# Patient Record
Sex: MALE | Race: WHITE | NOT HISPANIC OR LATINO | URBAN - METROPOLITAN AREA
[De-identification: names, ages, dates, MRNs, and addresses within clinical notes are randomized per-mention and may not be internally consistent; named-entity substitution may affect disease eponyms.]

---

## 2022-08-28 ENCOUNTER — INPATIENT (INPATIENT)
Facility: HOSPITAL | Age: 77
LOS: 6 days | Discharge: ANOTHER HOSPITAL | End: 2022-09-04
Attending: HOSPITALIST | Admitting: HOSPITALIST

## 2022-08-28 VITALS
RESPIRATION RATE: 17 BRPM | WEIGHT: 119.93 LBS | HEIGHT: 68 IN | TEMPERATURE: 98 F | DIASTOLIC BLOOD PRESSURE: 66 MMHG | OXYGEN SATURATION: 95 % | SYSTOLIC BLOOD PRESSURE: 109 MMHG | HEART RATE: 79 BPM

## 2022-08-28 DIAGNOSIS — E87.1 HYPO-OSMOLALITY AND HYPONATREMIA: ICD-10-CM
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
## 2022-08-28 DIAGNOSIS — D50.9 IRON DEFICIENCY ANEMIA, UNSPECIFIED: ICD-10-CM

## 2022-08-28 DIAGNOSIS — R32 UNSPECIFIED URINARY INCONTINENCE: ICD-10-CM

## 2022-08-28 DIAGNOSIS — M48.56XA COLLAPSED VERTEBRA, NOT ELSEWHERE CLASSIFIED, LUMBAR REGION, INITIAL ENCOUNTER FOR FRACTURE: ICD-10-CM
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
## 2022-08-28 DIAGNOSIS — Z95.5 PRESENCE OF CORONARY ANGIOPLASTY IMPLANT AND GRAFT: ICD-10-CM

## 2022-08-28 DIAGNOSIS — M84.48XA PATHOLOGICAL FRACTURE, OTHER SITE, INITIAL ENCOUNTER FOR FRACTURE: ICD-10-CM

## 2022-08-28 DIAGNOSIS — R63.6 UNDERWEIGHT: ICD-10-CM
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
## 2022-08-28 DIAGNOSIS — M48.54XA COLLAPSED VERTEBRA, NOT ELSEWHERE CLASSIFIED, THORACIC REGION, INITIAL ENCOUNTER FOR FRACTURE: ICD-10-CM

## 2022-08-28 DIAGNOSIS — M40.209 UNSPECIFIED KYPHOSIS, SITE UNSPECIFIED: ICD-10-CM

## 2022-08-28 DIAGNOSIS — S22.43XA MULTIPLE FRACTURES OF RIBS, BILATERAL, INITIAL ENCOUNTER FOR CLOSED FRACTURE: ICD-10-CM

## 2022-08-28 DIAGNOSIS — S72.142A DISPLACED INTERTROCHANTERIC FRACTURE OF LEFT FEMUR, INITIAL ENCOUNTER FOR CLOSED FRACTURE: ICD-10-CM

## 2022-08-28 DIAGNOSIS — R29.6 REPEATED FALLS: ICD-10-CM

## 2022-08-28 DIAGNOSIS — M81.0 AGE-RELATED OSTEOPOROSIS WITHOUT CURRENT PATHOLOGICAL FRACTURE: ICD-10-CM
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
## 2022-08-28 DIAGNOSIS — K59.00 CONSTIPATION, UNSPECIFIED: ICD-10-CM

## 2022-08-28 DIAGNOSIS — Z20.822 CONTACT WITH AND (SUSPECTED) EXPOSURE TO COVID-19: ICD-10-CM

## 2022-08-28 DIAGNOSIS — I10 ESSENTIAL (PRIMARY) HYPERTENSION: ICD-10-CM

## 2022-08-28 DIAGNOSIS — G20 PARKINSON'S DISEASE: ICD-10-CM

## 2022-08-28 DIAGNOSIS — I25.10 ATHEROSCLEROTIC HEART DISEASE OF NATIVE CORONARY ARTERY WITHOUT ANGINA PECTORIS: ICD-10-CM

## 2022-08-28 LAB
ALBUMIN SERPL ELPH-MCNC: 3.5 G/DL — SIGNIFICANT CHANGE UP (ref 3.5–5.2)
ALP SERPL-CCNC: 128 U/L — HIGH (ref 30–115)
ALT FLD-CCNC: <5 U/L — SIGNIFICANT CHANGE UP (ref 0–41)
ANION GAP SERPL CALC-SCNC: 9 MMOL/L — SIGNIFICANT CHANGE UP (ref 7–14)
APPEARANCE UR: CLEAR — SIGNIFICANT CHANGE UP
APTT BLD: 39.7 SEC — HIGH (ref 27–39.2)
AST SERPL-CCNC: 25 U/L — SIGNIFICANT CHANGE UP (ref 0–41)
BACTERIA # UR AUTO: NEGATIVE — SIGNIFICANT CHANGE UP
BASOPHILS # BLD AUTO: 0.03 K/UL — SIGNIFICANT CHANGE UP (ref 0–0.2)
BASOPHILS NFR BLD AUTO: 0.3 % — SIGNIFICANT CHANGE UP (ref 0–1)
BILIRUB SERPL-MCNC: 0.3 MG/DL — SIGNIFICANT CHANGE UP (ref 0.2–1.2)
BILIRUB UR-MCNC: NEGATIVE — SIGNIFICANT CHANGE UP
BLD GP AB SCN SERPL QL: SIGNIFICANT CHANGE UP
BUN SERPL-MCNC: 28 MG/DL — HIGH (ref 10–20)
CALCIUM SERPL-MCNC: 9.3 MG/DL — SIGNIFICANT CHANGE UP (ref 8.5–10.1)
CHLORIDE SERPL-SCNC: 102 MMOL/L — SIGNIFICANT CHANGE UP (ref 98–110)
CO2 SERPL-SCNC: 21 MMOL/L — SIGNIFICANT CHANGE UP (ref 17–32)
COD CRY URNS QL: NEGATIVE — SIGNIFICANT CHANGE UP
COLOR SPEC: YELLOW — SIGNIFICANT CHANGE UP
CREAT SERPL-MCNC: 0.7 MG/DL — SIGNIFICANT CHANGE UP (ref 0.7–1.5)
DIFF PNL FLD: NEGATIVE — SIGNIFICANT CHANGE UP
EGFR: 94 ML/MIN/1.73M2 — SIGNIFICANT CHANGE UP
EOSINOPHIL # BLD AUTO: 0 K/UL — SIGNIFICANT CHANGE UP (ref 0–0.7)
EOSINOPHIL NFR BLD AUTO: 0 % — SIGNIFICANT CHANGE UP (ref 0–8)
EPI CELLS # UR: NEGATIVE — SIGNIFICANT CHANGE UP
GLUCOSE SERPL-MCNC: 115 MG/DL — HIGH (ref 70–99)
GLUCOSE UR QL: NEGATIVE MG/DL — SIGNIFICANT CHANGE UP
GRAN CASTS # UR COMP ASSIST: NEGATIVE — SIGNIFICANT CHANGE UP
HCT VFR BLD CALC: 30.8 % — LOW (ref 42–52)
HGB BLD-MCNC: 9.9 G/DL — LOW (ref 14–18)
HYALINE CASTS # UR AUTO: NEGATIVE — SIGNIFICANT CHANGE UP
IMM GRANULOCYTES NFR BLD AUTO: 0.6 % — HIGH (ref 0.1–0.3)
INR BLD: 1.11 RATIO — SIGNIFICANT CHANGE UP (ref 0.65–1.3)
KETONES UR-MCNC: NEGATIVE — SIGNIFICANT CHANGE UP
LEUKOCYTE ESTERASE UR-ACNC: NEGATIVE — SIGNIFICANT CHANGE UP
LYMPHOCYTES # BLD AUTO: 0.96 K/UL — LOW (ref 1.2–3.4)
LYMPHOCYTES # BLD AUTO: 11.1 % — LOW (ref 20.5–51.1)
MCHC RBC-ENTMCNC: 26.9 PG — LOW (ref 27–31)
MCHC RBC-ENTMCNC: 32.1 G/DL — SIGNIFICANT CHANGE UP (ref 32–37)
MCV RBC AUTO: 83.7 FL — SIGNIFICANT CHANGE UP (ref 80–94)
MONOCYTES # BLD AUTO: 0.9 K/UL — HIGH (ref 0.1–0.6)
MONOCYTES NFR BLD AUTO: 10.4 % — HIGH (ref 1.7–9.3)
NEUTROPHILS # BLD AUTO: 6.69 K/UL — HIGH (ref 1.4–6.5)
NEUTROPHILS NFR BLD AUTO: 77.6 % — HIGH (ref 42.2–75.2)
NITRITE UR-MCNC: NEGATIVE — SIGNIFICANT CHANGE UP
NRBC # BLD: 0 /100 WBCS — SIGNIFICANT CHANGE UP (ref 0–0)
PH UR: 6.5 — SIGNIFICANT CHANGE UP (ref 5–8)
PLATELET # BLD AUTO: 321 K/UL — SIGNIFICANT CHANGE UP (ref 130–400)
POTASSIUM SERPL-MCNC: 4.7 MMOL/L — SIGNIFICANT CHANGE UP (ref 3.5–5)
POTASSIUM SERPL-SCNC: 4.7 MMOL/L — SIGNIFICANT CHANGE UP (ref 3.5–5)
PROT SERPL-MCNC: 6.4 G/DL — SIGNIFICANT CHANGE UP (ref 6–8)
PROT UR-MCNC: ABNORMAL MG/DL
PROTHROM AB SERPL-ACNC: 12.8 SEC — SIGNIFICANT CHANGE UP (ref 9.95–12.87)
RBC # BLD: 3.68 M/UL — LOW (ref 4.7–6.1)
RBC # FLD: 13.2 % — SIGNIFICANT CHANGE UP (ref 11.5–14.5)
RBC CASTS # UR COMP ASSIST: NEGATIVE — SIGNIFICANT CHANGE UP
SARS-COV-2 RNA SPEC QL NAA+PROBE: SIGNIFICANT CHANGE UP
SODIUM SERPL-SCNC: 132 MMOL/L — LOW (ref 135–146)
SP GR SPEC: 1.02 — SIGNIFICANT CHANGE UP (ref 1.01–1.03)
TRI-PHOS CRY UR QL COMP ASSIST: NEGATIVE — SIGNIFICANT CHANGE UP
URATE CRY FLD QL MICRO: NEGATIVE — SIGNIFICANT CHANGE UP
UROBILINOGEN FLD QL: 0.2 MG/DL — SIGNIFICANT CHANGE UP
WBC # BLD: 8.63 K/UL — SIGNIFICANT CHANGE UP (ref 4.8–10.8)
WBC # FLD AUTO: 8.63 K/UL — SIGNIFICANT CHANGE UP (ref 4.8–10.8)
WBC UR QL: SIGNIFICANT CHANGE UP /HPF

## 2022-08-28 PROCEDURE — 99285 EMERGENCY DEPT VISIT HI MDM: CPT | Mod: GC

## 2022-08-28 PROCEDURE — 71250 CT THORAX DX C-: CPT | Mod: 26,MA

## 2022-08-28 PROCEDURE — 74176 CT ABD & PELVIS W/O CONTRAST: CPT | Mod: 26,MA

## 2022-08-28 PROCEDURE — 72125 CT NECK SPINE W/O DYE: CPT | Mod: 26,MA

## 2022-08-28 PROCEDURE — 70450 CT HEAD/BRAIN W/O DYE: CPT | Mod: 26,MA

## 2022-08-28 RX ORDER — CARBIDOPA AND LEVODOPA 25; 100 MG/1; MG/1
1.5 TABLET ORAL
Refills: 0 | Status: DISCONTINUED | OUTPATIENT
Start: 2022-08-28 | End: 2022-09-04

## 2022-08-28 RX ORDER — SELEGILINE HYDROCHLORIDE 1.25 MG/1
5 TABLET, ORALLY DISINTEGRATING ORAL
Refills: 0 | Status: DISCONTINUED | OUTPATIENT
Start: 2022-08-28 | End: 2022-09-04

## 2022-08-28 RX ORDER — ASPIRIN/CALCIUM CARB/MAGNESIUM 324 MG
1 TABLET ORAL
Qty: 0 | Refills: 0 | DISCHARGE

## 2022-08-28 RX ORDER — ENOXAPARIN SODIUM 100 MG/ML
30 INJECTION SUBCUTANEOUS EVERY 24 HOURS
Refills: 0 | Status: DISCONTINUED | OUTPATIENT
Start: 2022-08-28 | End: 2022-08-28

## 2022-08-28 RX ORDER — ASCORBIC ACID 60 MG
1 TABLET,CHEWABLE ORAL
Qty: 0 | Refills: 0 | DISCHARGE

## 2022-08-28 RX ORDER — ASPIRIN/CALCIUM CARB/MAGNESIUM 324 MG
81 TABLET ORAL DAILY
Refills: 0 | Status: DISCONTINUED | OUTPATIENT
Start: 2022-08-28 | End: 2022-08-30

## 2022-08-28 RX ORDER — PANTOPRAZOLE SODIUM 20 MG/1
40 TABLET, DELAYED RELEASE ORAL
Refills: 0 | Status: DISCONTINUED | OUTPATIENT
Start: 2022-08-28 | End: 2022-09-04

## 2022-08-28 RX ORDER — METOPROLOL TARTRATE 50 MG
50 TABLET ORAL DAILY
Refills: 0 | Status: DISCONTINUED | OUTPATIENT
Start: 2022-08-28 | End: 2022-09-04

## 2022-08-28 RX ORDER — CARBIDOPA AND LEVODOPA 25; 100 MG/1; MG/1
1 TABLET ORAL
Qty: 0 | Refills: 0 | DISCHARGE

## 2022-08-28 RX ORDER — ATORVASTATIN CALCIUM 80 MG/1
20 TABLET, FILM COATED ORAL AT BEDTIME
Refills: 0 | Status: DISCONTINUED | OUTPATIENT
Start: 2022-08-28 | End: 2022-09-04

## 2022-08-28 RX ORDER — PANTOPRAZOLE SODIUM 20 MG/1
1 TABLET, DELAYED RELEASE ORAL
Qty: 0 | Refills: 0 | DISCHARGE

## 2022-08-28 RX ORDER — SELEGILINE HYDROCHLORIDE 1.25 MG/1
0 TABLET, ORALLY DISINTEGRATING ORAL
Qty: 0 | Refills: 0 | DISCHARGE

## 2022-08-28 RX ORDER — ZOLEDRONIC ACID 5 MG/100ML
0 INJECTION, SOLUTION INTRAVENOUS
Qty: 0 | Refills: 0 | DISCHARGE

## 2022-08-28 RX ORDER — CHOLECALCIFEROL (VITAMIN D3) 125 MCG
1 CAPSULE ORAL
Qty: 0 | Refills: 0 | DISCHARGE

## 2022-08-28 RX ORDER — CARBIDOPA AND LEVODOPA 25; 100 MG/1; MG/1
1.5 TABLET ORAL
Qty: 0 | Refills: 0 | DISCHARGE

## 2022-08-28 RX ORDER — POLYETHYLENE GLYCOL 3350 17 G/17G
17 POWDER, FOR SOLUTION ORAL DAILY
Refills: 0 | Status: DISCONTINUED | OUTPATIENT
Start: 2022-08-28 | End: 2022-08-29

## 2022-08-28 RX ORDER — AMLODIPINE BESYLATE 2.5 MG/1
5 TABLET ORAL DAILY
Refills: 0 | Status: DISCONTINUED | OUTPATIENT
Start: 2022-08-28 | End: 2022-09-02

## 2022-08-28 RX ORDER — METOPROLOL TARTRATE 50 MG
1 TABLET ORAL
Qty: 0 | Refills: 0 | DISCHARGE

## 2022-08-28 RX ORDER — ATORVASTATIN CALCIUM 80 MG/1
1 TABLET, FILM COATED ORAL
Qty: 0 | Refills: 0 | DISCHARGE

## 2022-08-28 RX ORDER — CARBIDOPA AND LEVODOPA 25; 100 MG/1; MG/1
1 TABLET ORAL
Refills: 0 | Status: DISCONTINUED | OUTPATIENT
Start: 2022-08-28 | End: 2022-08-31

## 2022-08-28 RX ORDER — ENOXAPARIN SODIUM 100 MG/ML
40 INJECTION SUBCUTANEOUS EVERY 24 HOURS
Refills: 0 | Status: DISCONTINUED | OUTPATIENT
Start: 2022-08-28 | End: 2022-08-30

## 2022-08-28 NOTE — H&P ADULT - HISTORY OF PRESENT ILLNESS
77 y M w/ PMHx of Parkinson's disease, Hx of back surgery (2019), H/O cardiac stent placement.  The patient is accompanied by his wife stating he had two falls 2 weeks ago, one was down several steps and the other he fell and struck his right knee. The patient has been participating in physical therapy for his on-going back issues and worsening Parkinson's disease, noting that his back pain has progressively worsened and has associated shooting pain down both legs. The patient came to ED today as his wife states its been very difficult for her to take care of him and he also started to develop urinary incontinence. The patient initially presented to ED south, Pan scan was performed demonstrating right 8th, left 9th rib fractures, acute fracture of right and left aspect of sacrum and several age indeterminate Thoracic compression fractures.   77 y M w/ PMHx of Parkinson's disease, Hx of back surgery (2019), H/O cardiac stent placement 2005. The patient came in for evaluation of multiple falls and back pain.  The patient is accompanied by his wife stating he had two falls 2 weeks ago, one was down several steps and the other he fell and struck his right knee. The patient had a dizzy and lightheaded sensation prior to one of the falls, but there was no LOC, urinary/fecal incontinence or post episode confusion. The second one was purely mechanical when he slipped going up a flight of stairs. The patient has been participating in physical therapy for his on-going back issues and worsening Parkinson's disease, noting that his back pain has progressively worsened and has associated shooting pain down both legs. The patient came to ED today as his wife states its been very difficult for her to take care of him and he also started to develop urinary incontinence. The urinary incontinence started a few days ago. There is no overwhelming urge to urinate and patient looses urine all of a sudden without realizing it. The patient initially presented to ED south, Pan scan was performed demonstrating right 8th, left 9th rib fractures, acute fracture of right and left aspect of sacrum and several age indeterminate Thoracic compression fractures.    The patient was evaluated by trauma and neurosurgery teams and will be admitted to medicine for W/U of recurrent falls.    77 y M with a PMHx of Parkinson's disease, Hx of back surgery (2019), H/O cardiac stent placement 2005, H/O esophogeal ulcers HTN, Osteoporosis. The patient came in for evaluation of multiple falls and back pain.  The patient is accompanied by his wife stating he had two falls 2 weeks ago, one was down several steps and the other he fell and struck his right knee. The patient had a dizzy and lightheaded sensation prior to one of the falls, but there was no LOC, urinary/fecal incontinence or post episode confusion. The second one was purely mechanical when he slipped going up a flight of stairs. The patient has been participating in physical therapy for his on-going back issues and worsening Parkinson's disease, noting that his back pain has progressively worsened and has associated shooting pain down both legs. The patient came to ED today as his wife states its been very difficult for her to take care of him and he also started to develop urinary incontinence. The urinary incontinence started a few days ago. There is no overwhelming urge to urinate and patient looses urine all of a sudden without realizing it. The patient initially presented to ED south, Pan scan was performed demonstrating right 8th, left 9th rib fractures, acute fracture of right and left aspect of sacrum and several age indeterminate Thoracic compression fractures.    The patient was evaluated by trauma and neurosurgery teams and will be admitted to medicine for W/U of recurrent falls.

## 2022-08-28 NOTE — ED PROVIDER NOTE - CARE PLAN
1 Principal Discharge DX:	Fall   Principal Discharge DX:	Fall  Secondary Diagnosis:	Sacral fracture  Secondary Diagnosis:	Rib fractures  Secondary Diagnosis:	Spinal compression fracture  Secondary Diagnosis:	Unable to ambulate

## 2022-08-28 NOTE — H&P ADULT - NSHPLABSRESULTS_GEN_ALL_CORE
9.9    8.63  )-----------( 321      ( 28 Aug 2022 16:14 )             30.8           132<L>  |  102  |  28<H>  ----------------------------<  115<H>  4.7   |  21  |  0.7    Ca    9.3      28 Aug 2022 16:14    TPro  6.4  /  Alb  3.5  /  TBili  0.3  /  DBili  x   /  AST  25  /  ALT  <5  /  AlkPhos  128<H>                Urinalysis Basic - ( 28 Aug 2022 18:35 )    Color: Yellow / Appearance: Clear / S.020 / pH: x  Gluc: x / Ketone: Negative  / Bili: Negative / Urobili: 0.2 mg/dL   Blood: x / Protein: Trace mg/dL / Nitrite: Negative   Leuk Esterase: Negative / RBC: Negative / WBC 1-2 /HPF   Sq Epi: x / Non Sq Epi: Negative / Bacteria: Negative        PT/INR - ( 28 Aug 2022 16:14 )   PT: 12.80 sec;   INR: 1.11 ratio         PTT - ( 28 Aug 2022 16:14 )  PTT:39.7 sec    Lactate Trend            CAPILLARY BLOOD GLUCOSE      IMPRESSION:  Acute fractures within the right and left aspect of the sacrum.    Acute right eighth and left ninth rib fractures. Cortical irregularity of   the left anterior fourth fifth and sixth ribs indeterminate age    Severe T10 and moderate to severe at T5 vertebral body compression   fracture of indeterminate age. Severe compression deformity of L2   vertebral body of indeterminate age.    --- End of Report ---          IMPRESSION:  Acute fractures within the right and left aspect of the sacrum.    Acute right eighth and left ninth rib fractures. Cortical irregularity of   the left anterior fourth fifth and sixth ribs indeterminate age    Severe T10 and moderate to severe at T5 vertebral body compression   fracture of indeterminate age. Severe compression deformity of L2   vertebral body of indeterminate age.    --- End of Report ---            SREE PEMBERTON MD; Attending Radiologist  This document has been electronically signed. Aug 28 2022  6:19PM

## 2022-08-28 NOTE — CONSULT NOTE ADULT - ASSESSMENT
77M with Hx CAD s/p stents s/p T11 to L4 fusion for L2 burst fx x 2 years ago presenting with back pain with severe compression fx's    PLAN   - No acute neurosurgical intervention   - MRI T/L spine w/wo   - Abdominal binder / TLSO   - Pain control   - Discussed case with attending

## 2022-08-28 NOTE — ED PROVIDER NOTE - NS ED ROS FT
Constitutional: (-) fever, (-) chills, (-) lethargy  Eyes: (-) eye pain, (-) visual changes, (-) discharge  ENMT: (-) nasal congestion, (-) sore throat. (-) neck pain (-) neck stiffness  Respiratory: (-) cough, (-) SOB, (-) BLAKE, (-) respiratory distress  Cardiac: (-) chest pain, (-) palpitations  GI: (-) abdominal pain, (-) nausea, (-) vomiting, (-) diarrhea.  :  (-) dysuria, (-) hematuria, (-) frequency   MS:  (+) back pain, (-) joint pain.  Neuro:  (-) headache, (-) numbness, (-) focal weakness, (-) tingling   Skin:  (-) rash  Except as documented in the HPI,  all other systems are negative

## 2022-08-28 NOTE — CONSULT NOTE ADULT - SUBJECTIVE AND OBJECTIVE BOX
NEUROSURGERY CONSULT  LALO HICKS   08-28-22 @ 21:38    HPI: 77 yr old m w/ a pmh significant for cad s/p stent, back pain s/p surgery, parkinson disease who presents with back pain. Pt states that for the past couple of days he has been having back that improves with naproxen and tylenol. Pt also has been having episodes of urinary incontinence. Pt denies any fevers, fecal incontinence or IVDU. Pt also denies any lower extremity numbness.     Neurosurgery was consulted for a patient s/p T11 to L4 fusion for L2 burst fx x 2 years ago in [place] who is presenting with back pain and some episodes of urinary incontinence. Patient states that he has been having frequent falls recently, unsure of when his last fall was. Wife also states that pt has been having difficulty walking / unable to bear weight due to pain x 2 weeks. States that patient has been having episodes of urinary incontinence x 1 week now. Patient states he has R posterior leg pain with numbness to R plantar foot. CT scan shows severe T10, mod T5 compression fx, and L2 compression deformity from previous burst fx.     RADIOLOGY:   < from: CT Chest No Cont (08.28.22 @ 16:56) >  IMPRESSION:  Acute fractures within the right and left aspect of the sacrum.    Acute right eighth and left ninth rib fractures. Cortical irregularity of   the left anterior fourth fifth and sixth ribs indeterminate age    Severe T10 and moderate to severe at T5 vertebral body compression   fracture of indeterminate age. Severe compression deformity of L2   vertebral body of indeterminate age.        PAST MEDICAL & SURGICAL HISTORY:  Parkinsons  History of back surgery  H/O heart artery stent    FAMILY HISTORY:      SOCIAL HISTORY:  Tobacco Use:  EtOH use:   Substance:    Allergies    No Known Allergies    Intolerances        [X] A ten-point review of systems is negative except as noted   [  ] Due to altered mental status/intubation, subjective information were not able to be obtained from the patient. History was obtained, to the extent possible, from review of the chart and collateral sources of information    MEDS:      PHYSICAL EXAM:  GENERAL: NAD, speaks in full sentences, no signs of respiratory distress  HEAD:  Atraumatic, Normocephalic  NECK: Supple, No JVD  CHEST/LUNG: Clear to auscultation bilaterally; No wheeze; No crackles; No accessory muscles used  HEART: Regular rate and rhythm;   ABDOMEN: Soft, Nontender, Nondistended; Bowel sounds present; No guarding  EXTREMITIES:  2+ Peripheral Pulses, No cyanosis or edema  MSK: No tenderness to palpation    NEUROLOGICAL EXAM   AOx3, appropriate, follows commands  PERRL, EOMI  FERGUSON x 4 with good strength 5/5  Sensation intact to light touch   No clonus  Reflexes +2     Vital Signs Last 24 Hrs  T(C): 36 (28 Aug 2022 20:10), Max: 36.4 (28 Aug 2022 15:33)  T(F): 96.8 (28 Aug 2022 20:10), Max: 97.5 (28 Aug 2022 15:33)  HR: 91 (28 Aug 2022 20:10) (79 - 91)  BP: 158/75 (28 Aug 2022 20:10) (109/66 - 158/75)  BP(mean): --  RR: 20 (28 Aug 2022 20:10) (17 - 20)  SpO2: 96% (28 Aug 2022 20:10) (95% - 97%)    Parameters below as of 28 Aug 2022 20:10  Patient On (Oxygen Delivery Method): room air          LABS:                        9.9    8.63  )-----------( 321      ( 28 Aug 2022 16:14 )             30.8     08-28    132<L>  |  102  |  28<H>  ----------------------------<  115<H>  4.7   |  21  |  0.7    Ca    9.3      28 Aug 2022 16:14    TPro  6.4  /  Alb  3.5  /  TBili  0.3  /  DBili  x   /  AST  25  /  ALT  <5  /  AlkPhos  128<H>  08-28    PT/INR - ( 28 Aug 2022 16:14 )   PT: 12.80 sec;   INR: 1.11 ratio         PTT - ( 28 Aug 2022 16:14 )  PTT:39.7 sec

## 2022-08-28 NOTE — H&P ADULT - ATTENDING COMMENTS
77 y M w/ PMHx of Parkinson's disease, Hx of back surgery (2019), H/O cardiac stent placement 2005. The patient came in for evaluation of multiple falls and back pain.  The patient is accompanied by his wife stating he had two falls 2 weeks ago, one was down several steps and the other he fell and struck his right knee. The patient had a dizzy and lightheaded sensation prior to one of the falls, but there was no LOC, urinary/fecal incontinence or post episode confusion. The second one was purely mechanical when he slipped going up a flight of stairs. The patient has been participating in physical therapy for his on-going back issues and worsening Parkinson's disease, noting that his back pain has progressively worsened and has associated shooting pain down both legs. The patient came to ED today as his wife states its been very difficult for her to take care of him and he also started to develop urinary incontinence. The urinary incontinence started a few days ago. There is no overwhelming urge to urinate and patient looses urine all of a sudden without realizing it. The patient initially presented to ED south, Pan scan was performed demonstrating right 8th, left 9th rib fractures, acute fracture of right and left aspect of sacrum and several age indeterminate Thoracic compression fractures.    Vital Signs Last 24 Hrs  T(F): 98.1 (29 Aug 2022 07:50), Max: 99.2 (29 Aug 2022 00:00)  HR: 82 (29 Aug 2022 07:50) (82 - 95)  BP: 119/58 (29 Aug 2022 07:50) (119/58 - 160/72)  RR: 18 (29 Aug 2022 07:50) (18 - 20)  SpO2: 96% (28 Aug 2022 20:10) (96% - 96%)    PHYSICAL EXAM:  GENERAL: NAD, well-groomed, well-developed  HEAD:  Atraumatic, Normocephalic  EYES: EOMI, conjunctiva and sclera clear  ENMT: Moist mucous membranes, Good dentition, no thrush  NECK: Supple, No JVD  CHEST/LUNG: Clear to auscultation bilaterally, good air entry, non-labored breathing  HEART: RRR; S1/S2, 2/6 systolic murmur   ABDOMEN: Soft, Nontender, Nondistended; Bowel sounds present  VASCULAR: Normal pulses, Normal capillary refill  EXTREMITIES: No calf tenderness, No cyanosis, No edema  LYMPH: Normal; No lymphadenopathy noted  SKIN: Warm, Intact  PSYCH: Normal mood, Normal affect  NERVOUS SYSTEM:  A/O x3, Good concentration; CN 2-12 intact, No focal deficits    #Back pain S/P Fall R/O cauda Equina syndrome  - Ct showed Severe T10 and moderate to severe at T5 vertebral body compression     fracture of indeterminate age. Severe compression deformity of L2     vertebral body of indeterminate age.  - neurosurgery following   - No acute neurosurgical intervention   - MRI T/L spine w/wo   - As per the wife who spoke to Dr. Kermit Gamez his hardware is MRI compatible so will order MRI   - Abdominal binder / TLSO   - Pain control   - CT head does not show acute bleed.   - EKG  - fall precautions   - PT/OT  - Orthostatics    #Urinary incontinency   - Likely Overflow based on hx  - Kidney bladder US with PVR  - MRI to R/O cord compression     #Constipation  -mirlax, senna, enema, lactulose  -rectal impaction    #Parkinson's disease  - C/W home parkinsons medication    #HTN  - C/W amlodipine 5 mg PO QD      #CAD S/P PCI  - C/W ASA  - Lipitor 20 mg PO QD  - Metoprolol succinate 50 mg PO QD      #Anemia   - Unknown baseline  - send iron studies  - B12, Folate      #hypoNa  - Serum and urine osmolarity  - Urine sodium  - Encourage PO intake   - repeat Na      #Elevated Alk Phos  - Likely 2/2 to bone etiology  - GGT in am        DVT PPX: Lovenox  Diet: Dash   Dispo: Acute  Pending: mri, bm

## 2022-08-28 NOTE — ED PROVIDER NOTE - OBJECTIVE STATEMENT
76 yo M with PMH of Parkinson's, HTN, HLD, CAD, L2 fracture s/p fusion 2019 ([facility]) presenting for 1 week of difficulty ambulating and urinary incontinence. Patient goes to PT weekly and therapist noticed that patient had abnormal gait ~1 week ago, told patient and family to go to ED, but patient waited until ambulation got worse. Patient has had multiple falls in the past, most recently ~2 weeks ago when patient tripped backward and landed on his buttock. Patient endorses back pain, but no n/w/t, headache, vision changes, dizziness, CP, SOB, NVD, fever, cold/flu symptoms, dysuria, hematuria, melena, hematochezia.

## 2022-08-28 NOTE — CONSULT NOTE ADULT - SUBJECTIVE AND OBJECTIVE BOX
TRAUMA ACTIVATION LEVEL:  CONSULT  ACTIVATED BY:   ED**  INTUBATED: NO**    MECHANISM OF INJURY:   [X] Fall	    GCS: 15 	E: 4	V: 5	M: 6    HPI:    77yM w/ PMHx of Parkinson's disease, Hx of back surgery (2019), H/O cardiac stent placement, seen as Trauma Consult s/p fall 2 weeks ago, -ht, -loc, -ac.  The patient is accompanied by his wife stating he had two falls 2 weeks ago, one was down several steps and the other he fell and struck his right knee. The patient has been participating in physical therapy for his on-going back issues and worsening Parkinson's disease, noting that his back pain has progressively worsened and has associated shooting pain down both legs. The patient came to ED today as his wife states its been very difficult for her to take care of him and he also started to develop urinary incontinence. The patient initially presented to ED south, Pan scan was performed demonstrating right 8th, left 9th rib fractures, acute fracture of right and left aspect of sacrum and several age indeterminate Thoracic compression fractures. The patient is completely non-tender on examination, is pulling 1500 on incentive. Neurosurgery evaluated the patient and recommends abdominal binder and non-emergent MRI.     Trauma assessment in ED: ABCs intact , GCS 15 , AAOx3.    PAST MEDICAL & SURGICAL HISTORY:  Parkinsons  History of back surgery  H/O heart artery stent    Allergies    No Known Allergies    Intolerances    Home Medications:  amLODIPine 5 mg oral tablet: 1 tab(s) orally once a day (28 Aug 2022 15:51)  atorvastatin 20 mg oral tablet: 1 tab(s) orally once a day (28 Aug 2022 15:51)  carbidopa-levodopa 25 mg-100 mg oral tablet, extended release: 1.5 tab(s) orally every 6 hours (28 Aug 2022 15:51)  carbidopa-levodopa 25 mg-250 mg oral tablet: 1 tab(s) orally 3 times a day (28 Aug 2022 15:51)  Metoprolol Succinate ER 50 mg oral tablet, extended release: 1 tab(s) orally once a day (28 Aug 2022 15:51)  pantoprazole 40 mg oral delayed release tablet: 1 tab(s) orally once a day (28 Aug 2022 15:51)  Reclast:  (28 Aug 2022 15:51)  selegiline 5 mg oral tablet: orally once a day (28 Aug 2022 15:51)      ROS: 10-system review is otherwise negative except HPI above.      Primary Survey:    A - airway intact  B - bilateral breath sounds and good chest rise  C - palpable pulses in all extremities  D - GCS 15 on arrival, FERGUSON  Exposure obtained    Vital Signs Last 24 Hrs  T(C): 36 (28 Aug 2022 20:10), Max: 36.4 (28 Aug 2022 15:33)  T(F): 96.8 (28 Aug 2022 20:10), Max: 97.5 (28 Aug 2022 15:33)  HR: 91 (28 Aug 2022 20:10) (79 - 91)  BP: 158/75 (28 Aug 2022 20:10) (109/66 - 158/75)  BP(mean): --  RR: 20 (28 Aug 2022 20:10) (17 - 20)  SpO2: 96% (28 Aug 2022 20:10) (95% - 97%)    Parameters below as of 28 Aug 2022 20:10  Patient On (Oxygen Delivery Method): room air        Secondary Survey:   General: NAD  HEENT: Normocephalic, atraumatic, EOMI, PEERLA. no scalp lacerations   Neck: Soft, midline trachea. no c-spine tenderness  Chest: No chest wall tenderness, no subcutaneous emphysema   Cardiac: S1, S2, RRR  Respiratory: Bilateral breath sounds, clear and equal bilaterally  Abdomen: Soft, non-distended, non-tender, no rebound, no guarding.  Groin: Normal appearing, pelvis stable   Ext:  Moving b/l upper and lower extremities. Palpable Radial b/l UE, b/l DP palpable in LE.   Back: Large well healed surgical incision from thoracic to lumbar spine. No T/L/S spine tenderness, No palpable runoff/stepoff/deformity  Rectal:  CRISTY with good tone        ACCESS / DEVICES:  [x ] Peripheral IV    Labs:  CAPILLARY BLOOD GLUCOSE                    9.9    8.63  )-----------( 321      ( 28 Aug 2022 16:14 )             30.8       Auto Neutrophil %: 77.6 % (22 @ 16:14)  Auto Immature Granulocyte %: 0.6 % (22 @ 16:14)        132<L>  |  102  |  28<H>  ----------------------------<  115<H>  4.7   |  21  |  0.7      Calcium, Total Serum: 9.3 mg/dL (22 @ 16:14)      LFTs:             6.4  | 0.3  | 25       ------------------[128     ( 28 Aug 2022 16:14 )  3.5  | x    | <5          Coags:     12.80  ----< 1.11    ( 28 Aug 2022 16:14 )     39.7        Urinalysis Basic - ( 28 Aug 2022 18:35 )    Color: Yellow / Appearance: Clear / S.020 / pH: x  Gluc: x / Ketone: Negative  / Bili: Negative / Urobili: 0.2 mg/dL   Blood: x / Protein: Trace mg/dL / Nitrite: Negative   Leuk Esterase: Negative / RBC: Negative / WBC 1-2 /HPF   Sq Epi: x / Non Sq Epi: Negative / Bacteria: Negative    RADIOLOGY & ADDITIONAL STUDIES:  < from: CT Chest No Cont (22 @ 16:56) >  IMPRESSION:  Acute fractures within the right and left aspect of the sacrum.    Acute right eighth and left ninth rib fractures. Cortical irregularity of   the left anterior fourth fifth and sixth ribs indeterminate age    Severe T10 and moderate to severe at T5 vertebral body compression   fracture of indeterminate age. Severe compression deformity of L2   vertebral body of indeterminate age.    --- End of Report ---    < end of copied text >  < from: CT Abdomen and Pelvis No Cont (22 @ 16:56) >  IMPRESSION:  Acute fractures within the right and left aspect of the sacrum.    Acute right eighth and left ninth rib fractures. Cortical irregularity of   the left anterior fourth fifth and sixth ribs indeterminate age    Severe T10 and moderate to severe at T5 vertebral body compression   fracture of indeterminate age. Severe compression deformity of L2   vertebral body of indeterminate age.    --- End of Report ---    < end of copied text >  < from: CT Cervical Spine No Cont (22 @ 16:56) >  IMPRESSION:   Examination is significantly limited due to motion artifact.  No definite evidence of acute cervical spine fracture or subluxation. If   there is clinical concern for cervical spine fracture, recommend repeat   CT.    --- End of Report ---    < end of copied text >  < from: CT Head No Cont (22 @ 16:56) >  IMPRESSION:    No acute intracranial hemorrhage, mass-effect or midline shift.    Numerous calvarial lucent lesion, increased since prior exam (CT head   12/3/2004) of indeterminate etiology        --- End of Report ---    < end of copied text >    ---------------------------------------------------------------------------------------     TRAUMA ACTIVATION LEVEL:  CONSULT  ACTIVATED BY:   ED**  INTUBATED: NO**    MECHANISM OF INJURY:   [X] Fall	    GCS: 15 	E: 4	V: 5	M: 6    HPI:  77yM w/ PMHx of Parkinson's disease, Hx of back surgery (2019), H/O cardiac stent placement, seen as Trauma Consult s/p fall 2 weeks ago, -ht, -loc, -ac.  The patient is accompanied by his wife stating he had two falls 2 weeks ago, one was down several steps and the other he fell and struck his right knee. The patient has been participating in physical therapy for his on-going back issues and worsening Parkinson's disease, noting that his back pain has progressively worsened and has associated shooting pain down both legs. The patient came to ED today as his wife states its been very difficult for her to take care of him and he also started to develop urinary incontinence. The patient [place] presented to ED [place], Pan scan was performed demonstrating right 8th, left 9th rib fractures, acute fracture of right and left aspect of sacrum and several age indeterminate Thoracic compression fractures. The patient is completely non-tender on examination, is pulling 1500 on incentive. Neurosurgery evaluated the patient and recommends abdominal binder and non-emergent MRI.     Trauma assessment in ED: ABCs intact , GCS 15 , AAOx3.    PAST MEDICAL & SURGICAL HISTORY:  Parkinsons  History of back surgery  H/O heart artery stent    Allergies    No Known Allergies    Intolerances    Home Medications:  amLODIPine 5 mg oral tablet: 1 tab(s) orally once a day (28 Aug 2022 15:51)  atorvastatin 20 mg oral tablet: 1 tab(s) orally once a day (28 Aug 2022 15:51)  carbidopa-levodopa 25 mg-100 mg oral tablet, extended release: 1.5 tab(s) orally every 6 hours (28 Aug 2022 15:51)  carbidopa-levodopa 25 mg-250 mg oral tablet: 1 tab(s) orally 3 times a day (28 Aug 2022 15:51)  Metoprolol Succinate ER 50 mg oral tablet, extended release: 1 tab(s) orally once a day (28 Aug 2022 15:51)  pantoprazole 40 mg oral delayed release tablet: 1 tab(s) orally once a day (28 Aug 2022 15:51)  Reclast:  (28 Aug 2022 15:51)  selegiline 5 mg oral tablet: orally once a day (28 Aug 2022 15:51)    ROS: 10-system review is otherwise negative except HPI above.      Primary Survey:    A - airway intact  B - bilateral breath sounds and good chest rise  C - palpable pulses in all extremities  D - GCS 15 on arrival, FERGUSON  Exposure obtained    Vital Signs Last 24 Hrs  T(C): 36 (28 Aug 2022 20:10), Max: 36.4 (28 Aug 2022 15:33)  T(F): 96.8 (28 Aug 2022 20:10), Max: 97.5 (28 Aug 2022 15:33)  HR: 91 (28 Aug 2022 20:10) (79 - 91)  BP: 158/75 (28 Aug 2022 20:10) (109/66 - 158/75)  BP(mean): --  RR: 20 (28 Aug 2022 20:10) (17 - 20)  SpO2: 96% (28 Aug 2022 20:10) (95% - 97%)    Parameters below as of 28 Aug 2022 20:10  Patient On (Oxygen Delivery Method): room air    Secondary Survey:   General: NAD  HEENT: Normocephalic, atraumatic, EOMI, PEERLA. no scalp lacerations   Neck: Soft, midline trachea. no c-spine tenderness  Chest: No chest wall tenderness, no subcutaneous emphysema   Cardiac: S1, S2, RRR  Respiratory: Bilateral breath sounds, clear and equal bilaterally  Abdomen: Soft, non-distended, non-tender, no rebound, no guarding.  Groin: Normal appearing, pelvis stable   Ext:  Moving b/l upper and lower extremities. Palpable Radial b/l UE, b/l DP palpable in LE.   Back: Large well healed surgical incision from thoracic to lumbar spine. No T/L/S spine tenderness, No palpable runoff/stepoff/deformity  Rectal:  good tone    ACCESS / DEVICES:  [x ] Peripheral IV    Labs:  CAPILLARY BLOOD GLUCOSE                    9.9    8.63  )-----------( 321      ( 28 Aug 2022 16:14 )             30.8       Auto Neutrophil %: 77.6 % (22 @ 16:14)  Auto Immature Granulocyte %: 0.6 % (22 @ 16:14)        132<L>  |  102  |  28<H>  ----------------------------<  115<H>  4.7   |  21  |  0.7      Calcium, Total Serum: 9.3 mg/dL (22 @ 16:14)      LFTs:             6.4  | 0.3  | 25       ------------------[128     ( 28 Aug 2022 16:14 )  3.5  | x    | <5          Coags:     12.80  ----< 1.11    ( 28 Aug 2022 16:14 )     39.7        Urinalysis Basic - ( 28 Aug 2022 18:35 )    Color: Yellow / Appearance: Clear / S.020 / pH: x  Gluc: x / Ketone: Negative  / Bili: Negative / Urobili: 0.2 mg/dL   Blood: x / Protein: Trace mg/dL / Nitrite: Negative   Leuk Esterase: Negative / RBC: Negative / WBC 1-2 /HPF   Sq Epi: x / Non Sq Epi: Negative / Bacteria: Negative    RADIOLOGY & ADDITIONAL STUDIES:  < from: CT Chest No Cont (22 @ 16:56) >  IMPRESSION:  Acute fractures within the right and left aspect of the sacrum.    Acute right eighth and left ninth rib fractures. Cortical irregularity of   the left anterior fourth fifth and sixth ribs indeterminate age    Severe T10 and moderate to severe at T5 vertebral body compression   fracture of indeterminate age. Severe compression deformity of L2   vertebral body of indeterminate age.  --- End of Report ---    < from: CT Cervical Spine No Cont (22 @ 16:56) >  IMPRESSION:   Examination is significantly limited due to motion artifact.  No definite evidence of acute cervical spine fracture or subluxation. If   there is clinical concern for cervical spine fracture, recommend repeat   CT.  --- End of Report ---    < from: CT Head No Cont (22 @ 16:56) >  IMPRESSION:  No acute intracranial hemorrhage, mass-effect or midline shift.    Numerous calvarial lucent lesion, increased since prior exam (CT head   12/3/2004) of indeterminate etiology  --- End of Report ---    ---------------------------------------------------------------------------------------

## 2022-08-28 NOTE — H&P ADULT - ASSESSMENT
#Back pain S/P Fall  - Ct showed Severe T10 and moderate to severe at T5 vertebral body compression     fracture of indeterminate age. Severe compression deformity of L2     vertebral body of indeterminate age.  - neurosurgery following   - No acute neurosurgical intervention   - MRI T/L spine w/wo   - Abdominal binder / TLSO   - Pain control   - CT head does not show acute bleed.   - EKG  - fall precautions       #Anemia   - Unknown baseline  - send iron studies  - B12, Folate      #hypoNa  - Serum and urine osmolarity  - Urine sodium  - Encourage PO intake   - repeat Na      #Elevated Alk Phos  - Likely 2/2 to bone etiology  - GGT in am       77 y M w/ PMHx of Parkinson's disease, Hx of back surgery (2019), H/O cardiac stent placement 2005. The patient came in for evaluation of multiple falls and back pain.  The patient is accompanied by his wife stating he had two falls 2 weeks ago, one was down several steps and the other he fell and struck his right knee. The patient had a dizzy and lightheaded sensation prior to one of the falls, but there was no LOC, urinary/fecal incontinence or post episode confusion. The second one was purely mechanical when he slipped going up a flight of stairs. The patient has been participating in physical therapy for his on-going back issues and worsening Parkinson's disease, noting that his back pain has progressively worsened and has associated shooting pain down both legs. The patient came to ED today as his wife states its been very difficult for her to take care of him and he also started to develop urinary incontinence. The urinary incontinence started a few days ago. There is no overwhelming urge to urinate and patient looses urine all of a sudden without realizing it. The patient [place] presented to ED [place], Pan scan was performed demonstrating right 8th, left 9th rib fractures, acute fracture of right and left aspect of sacrum and several age indeterminate Thoracic compression fractures.      #Back pain S/P Fall R/O cauda Equina syndrome  - Ct showed Severe T10 and moderate to severe at T5 vertebral body compression     fracture of indeterminate age. Severe compression deformity of L2     vertebral body of indeterminate age.  - neurosurgery following   - No acute neurosurgical intervention   - MRI T/L spine w/wo   - As per the wife who spoke to Dr. Kermit Gamez his hardware is MRI compatible so will order MRI   - Abdominal binder / TLSO   - Pain control   - CT head does not show acute bleed.   - EKG  - fall precautions   - PT/OT    #Urinary incontinency   - Likely Overflow based on hx  - Kidney bladder US with PVR  - MRI to R/O cord compression       #Parkinson's disease  - C/W home parkinsons medication    #HTN  - C/W amlodipine 5 mg PO QD      #CAD S/P PCI  - C/W ASA  - Lipitor 20 mg PO QD  - Metoprolol succinate 50 mg PO QD      #Anemia   - Unknown baseline  - send iron studies  - B12, Folate      #hypoNa  - Serum and urine osmolarity  - Urine sodium  - Encourage PO intake   - repeat Na      #Elevated Alk Phos  - Likely 2/2 to bone etiology  - GGT in am    #Constipation  - start Miralax      DVT PPX: Lovenox  Diet: Dash   Dispo: Acute     77 y M w/ PMHx of Parkinson's disease, Hx of back surgery (2019), H/O cardiac stent placement 2005. The patient came in for evaluation of multiple falls and back pain.  The patient is accompanied by his wife stating he had two falls 2 weeks ago, one was down several steps and the other he fell and struck his right knee. The patient had a dizzy and lightheaded sensation prior to one of the falls, but there was no LOC, urinary/fecal incontinence or post episode confusion. The second one was purely mechanical when he slipped going up a flight of stairs. The patient has been participating in physical therapy for his on-going back issues and worsening Parkinson's disease, noting that his back pain has progressively worsened and has associated shooting pain down both legs. The patient came to ED today as his wife states its been very difficult for her to take care of him and he also started to develop urinary incontinence. The urinary incontinence started a few days ago. There is no overwhelming urge to urinate and patient looses urine all of a sudden without realizing it. The patient initially presented to ED south, Pan scan was performed demonstrating right 8th, left 9th rib fractures, acute fracture of right and left aspect of sacrum and several age indeterminate Thoracic compression fractures.      #Back pain S/P Fall R/O cauda Equina syndrome  - Ct showed Severe T10 and moderate to severe at T5 vertebral body compression     fracture of indeterminate age. Severe compression deformity of L2     vertebral body of indeterminate age.  - neurosurgery following   - No acute neurosurgical intervention   - MRI T/L spine w/wo   - As per the wife who spoke to Dr. Kermit Gamez his hardware is MRI compatible so will order MRI   - Abdominal binder / TLSO   - Pain control   - CT head does not show acute bleed.   - EKG  - fall precautions   - PT/OT  - Orthostatics    #Urinary incontinency   - Likely Overflow based on hx  - Kidney bladder US with PVR  - MRI to R/O cord compression       #Parkinson's disease  - C/W home parkinsons medication    #HTN  - C/W amlodipine 5 mg PO QD      #CAD S/P PCI  - C/W ASA  - Lipitor 20 mg PO QD  - Metoprolol succinate 50 mg PO QD      #Anemia   - Unknown baseline  - send iron studies  - B12, Folate      #hypoNa  - Serum and urine osmolarity  - Urine sodium  - Encourage PO intake   - repeat Na      #Elevated Alk Phos  - Likely 2/2 to bone etiology  - GGT in am    #Constipation  - start Miralax      DVT PPX: Lovenox  Diet: Dash   Dispo: Acute

## 2022-08-28 NOTE — CONSULT NOTE ADULT - ASSESSMENT
ASSESSMENT:  77y Male  w/ PMHx of Parkinson's disease, hx of back surgery, CAD s/p stents seen as Trauma Consult s/p fall two weeks prior to arrival, -ht, -loc, -ac with complaint of low back pain, shooting pains down both legs , external signs of trauma include none. Transferred from [facility] for neurosurgery and trauma evaluation. On examination, the patient has no tenderness and is pulling 1.5L on incentive.   Trauma assessment in ED: ABCs intact , GCS 15 , AAOx3,  FERGUSON.     Injuries identified:   - Right 8th, left 9th rib fractures  - Fractures of right/left aspect of the sacrum    PLAN:   -Neurosurgery recommends non-urgent MRI, abdominal binder  -As stated above, the fall occurred two weeks prior to arrival to ED, the patient has no tenderness on examination, symptoms are consistent with sciatica and are unlikely due to age indeterminate fractures.  -Cleared from the trauma perspective, disposition as per ED.          Above plan discussed with Trauma attending, Dr. De  , patient, patient family, and ED team  --------------------------------------------------------------------------------------  08-28-22 @ 21:42 ASSESSMENT:  77y Male  w/ PMHx of Parkinson's disease, hx of back surgery, CAD s/p stents seen as Trauma Consult s/p fall two weeks prior to arrival, -ht, -loc, -ac with complaint of low back pain, shooting pains down both legs , external signs of trauma include none. Transferred from [facility] for neurosurgery and trauma evaluation. On examination, the patient has no tenderness and is pulling 1.5L on incentive. Trauma assessment in ED: ABCs intact , GCS 15 , AAOx3,  FERGUSON.     Injuries identified:   - Right 8th, left 9th rib fractures  - Fractures of right/left aspect of the sacrum    PLAN:   -Neurosurgery recommends non-urgent MRI, abdominal binder  -As stated above, the fall occurred two weeks prior to arrival to ED, the patient has no tenderness on examination, symptoms are consistent with sciatica and are unlikely due to age indeterminate fractures.  -No further traumatic workup warranted, disposition as per ED.    Above plan discussed with Trauma attending, Dr. De, patient, patient family, and ED team  --------------------------------------------------------------------------------------  08-28-22 @ 21:42    Trauma Spectra: [phone]

## 2022-08-28 NOTE — PATIENT PROFILE ADULT - FALL HARM RISK - HARM RISK INTERVENTIONS

## 2022-08-28 NOTE — ED PROVIDER NOTE - PROGRESS NOTE DETAILS
ER: pt found to have mulitple acute fxs. pt to be sent to [facility] for trauma/neurosurgery evaluation due to acute fxs. pt accepted by Dr. Lewis Spoke to pts son Latrell Cueva at [phone] who agreed with plan for transfer. pt to be transferred to the [facility]. BK: Patient arrived from south. Complaining of mild back pain, but resting comfortably. No midline tenderness, CN2-12 in tact, sensation and strength intact upper and lower extremities. Neurosurgery aware and will see patient. BK: Discussed case with surgery, no need for surgical admission as patient's last fall was 2 weeks ago. Discussed case with neurosurgery. Requesting thoracic and lumbar MRI and will follow. No acute intervention.

## 2022-08-28 NOTE — ED PROVIDER NOTE - PHYSICAL EXAMINATION
CONSTITUTIONAL: well-appearing, in NAD  SKIN: Warm dry, normal skin turgor  HEAD: NCAT; no raccoon eyes, hemotympanum, or fox sign   EYES: EOMI, PERRLA, no scleral icterus, conjunctiva pink  ENT: normal pharynx with no erythema or exudates  NECK: Supple; non tender. Full ROM. no c/t/l/s tenderness  CARD: RRR, no murmurs.  RESP: clear to ausculation b/l. No crackles or wheezing.  ABD: soft, non-tender, non-distended, no rebound or guarding.  EXT: Full ROM, no bony tenderness, no pedal edema, no calf tenderness  NEURO: normal motor. normal sensory. CN II-XII intact. Cerebellar testing normal.   PSYCH: Cooperative, appropriate.

## 2022-08-28 NOTE — ED ADULT TRIAGE NOTE - CHIEF COMPLAINT QUOTE
BIBA from home as per patient "My lower back has been hurting me since Wednesday or Thursday and I've been urinating on myself since then". Patient adds "About three weeks ago I was going up the stairs, got dizzy and fell back two steps hurting my left hand and right knee, I was seen in the urgent care and I've been going to physical therapy for my Parkinsons". Patient denies numbness/tingling and dizziness.

## 2022-08-28 NOTE — ED PROVIDER NOTE - CLINICAL SUMMARY MEDICAL DECISION MAKING FREE TEXT BOX
77-year-old male presents emergency department for fall.  Seen at the [facility] emergency department with identification of new fractures and concern for incontinence.  Patient transferred to emergency department [place] for neurosurgical evaluation.  No acute interventions recommended by bedside neurology surgical consultation after evaluation.  No events during transport by EMS.  Plan was discussed with patient and family in detail with admission recommended.  Additional trauma consult placed in emergency department for several fractures noted, felt to be old but being evaluated for first now.

## 2022-08-28 NOTE — ED PROVIDER NOTE - ATTENDING CONTRIBUTION TO CARE
77 yr old m w/ a pmh significant for cad s/p stent, back pain s/p surgery, parkinson disease who presents with back pain. Pt states that for the past couple of days he has been having back that improves with naproxen and tylenol. Pt also has been having episodes of urinary incontinence. Pt denies any fevers, fecal incontinence or IVDU. Pt also denies any lower extremity numbness.     VITAL SIGNS: I have reviewed nursing notes and confirm.  CONSTITUTIONAL: non-toxic, well appearing  SKIN: no rash, no petechiae.  EYES: EOMI, pink conjunctiva, anicteric  ENT: tongue midline, no exudates, MMM  NECK: Supple; no meningismus, no JVD  CARD: RRR, no murmurs, equal radial pulses bilaterally 2+  RESP: CTAB, no respiratory distress  ABD: Soft, non-tender, non-distended, no peritoneal signs, no HSM, no CVA tenderness  EXT: Normal ROM x4. No edema. No calves tenderness  NEURO: Alert, oriented x3. CN2-12 intact, equal strength bilaterally    a/p  77 yr old m that presents with lower back pain and urinary incontinence  -labs  -imaging  -neurosurgery aware  -pain management  -reassess  -dispo pending

## 2022-08-28 NOTE — H&P ADULT - NSHPPHYSICALEXAM_GEN_ALL_CORE
PHYSICAL EXAM:  GENERAL: NAD, lying in bed comfortably  HEAD:  Atraumatic, Normocephalic  EYES: EOMI, PERRLA, conjunctiva and sclera clear  ENT: Moist mucous membranes  NECK: Supple, No JVD  CHEST/LUNG: Clear to auscultation bilaterally; No rales, rhonchi, wheezing, or rubs. Unlabored respirations  HEART: Regular rate and rhythm; No murmurs, rubs, or gallops  ABDOMEN: Bowel sounds present; Soft, Nontender, Nondistended. No hepatomegally  EXTREMITIES:  2+ Peripheral Pulses, brisk capillary refill. No clubbing, cyanosis, or edema  NERVOUS SYSTEM:  Alert & Oriented X3, speech clear. No deficits   MSK: FROM all 4 extremities, full and equal strength  SKIN: No rashes or lesions PHYSICAL EXAM:  GENERAL: NAD, lying in bed comfortably, frail  HEAD:  Atraumatic, Normocephalic  EYES: EOMI, PERRLA, conjunctiva and sclera clear  ENT: Moist mucous membranes  NECK: Supple, No JVD  CHEST/LUNG: Clear to auscultation bilaterally; No rales, rhonchi, wheezing, or rubs. Unlabored respirations  HEART: Regular rate and rhythm; No murmurs, rubs, or gallops  ABDOMEN: Bowel sounds present; Soft, Nontender, Nondistended. No hepatomegally  EXTREMITIES:  2+ Peripheral Pulses, brisk capillary refill. No clubbing, cyanosis, or edema  NERVOUS SYSTEM:  Alert & Oriented X3, speech clear. No deficits   MSK: FROM all 4 extremities, full and equal strength, SLR +ve in the B/L lower extremities  SKIN: No rashes or lesions

## 2022-08-29 LAB
ALBUMIN SERPL ELPH-MCNC: 3.5 G/DL — SIGNIFICANT CHANGE UP (ref 3.5–5.2)
ALP SERPL-CCNC: 116 U/L — HIGH (ref 30–115)
ALT FLD-CCNC: <5 U/L — SIGNIFICANT CHANGE UP (ref 0–41)
ANION GAP SERPL CALC-SCNC: 7 MMOL/L — SIGNIFICANT CHANGE UP (ref 7–14)
AST SERPL-CCNC: 28 U/L — SIGNIFICANT CHANGE UP (ref 0–41)
BILIRUB SERPL-MCNC: 0.3 MG/DL — SIGNIFICANT CHANGE UP (ref 0.2–1.2)
BUN SERPL-MCNC: 19 MG/DL — SIGNIFICANT CHANGE UP (ref 10–20)
CALCIUM SERPL-MCNC: 9 MG/DL — SIGNIFICANT CHANGE UP (ref 8.5–10.1)
CHLORIDE SERPL-SCNC: 102 MMOL/L — SIGNIFICANT CHANGE UP (ref 98–110)
CO2 SERPL-SCNC: 24 MMOL/L — SIGNIFICANT CHANGE UP (ref 17–32)
CREAT SERPL-MCNC: 0.6 MG/DL — LOW (ref 0.7–1.5)
CULTURE RESULTS: SIGNIFICANT CHANGE UP
EGFR: 98 ML/MIN/1.73M2 — SIGNIFICANT CHANGE UP
FERRITIN SERPL-MCNC: 106 NG/ML — SIGNIFICANT CHANGE UP (ref 30–400)
FOLATE SERPL-MCNC: 9.1 NG/ML — SIGNIFICANT CHANGE UP
GGT SERPL-CCNC: 16 U/L — SIGNIFICANT CHANGE UP (ref 1–40)
GLUCOSE SERPL-MCNC: 111 MG/DL — HIGH (ref 70–99)
HCT VFR BLD CALC: 30.8 % — LOW (ref 42–52)
HGB BLD-MCNC: 9.9 G/DL — LOW (ref 14–18)
MAGNESIUM SERPL-MCNC: 2.1 MG/DL — SIGNIFICANT CHANGE UP (ref 1.8–2.4)
MCHC RBC-ENTMCNC: 26.5 PG — LOW (ref 27–31)
MCHC RBC-ENTMCNC: 32.1 G/DL — SIGNIFICANT CHANGE UP (ref 32–37)
MCV RBC AUTO: 82.6 FL — SIGNIFICANT CHANGE UP (ref 80–94)
NRBC # BLD: 0 /100 WBCS — SIGNIFICANT CHANGE UP (ref 0–0)
PLATELET # BLD AUTO: 345 K/UL — SIGNIFICANT CHANGE UP (ref 130–400)
POTASSIUM SERPL-MCNC: 4.3 MMOL/L — SIGNIFICANT CHANGE UP (ref 3.5–5)
POTASSIUM SERPL-SCNC: 4.3 MMOL/L — SIGNIFICANT CHANGE UP (ref 3.5–5)
PROT SERPL-MCNC: 6.3 G/DL — SIGNIFICANT CHANGE UP (ref 6–8)
RBC # BLD: 3.73 M/UL — LOW (ref 4.7–6.1)
RBC # FLD: 13.5 % — SIGNIFICANT CHANGE UP (ref 11.5–14.5)
SODIUM SERPL-SCNC: 133 MMOL/L — LOW (ref 135–146)
SPECIMEN SOURCE: SIGNIFICANT CHANGE UP
VIT B12 SERPL-MCNC: 504 PG/ML — SIGNIFICANT CHANGE UP (ref 232–1245)
WBC # BLD: 7.17 K/UL — SIGNIFICANT CHANGE UP (ref 4.8–10.8)
WBC # FLD AUTO: 7.17 K/UL — SIGNIFICANT CHANGE UP (ref 4.8–10.8)

## 2022-08-29 PROCEDURE — 93010 ELECTROCARDIOGRAM REPORT: CPT

## 2022-08-29 PROCEDURE — 72158 MRI LUMBAR SPINE W/O & W/DYE: CPT | Mod: 26

## 2022-08-29 PROCEDURE — 72157 MRI CHEST SPINE W/O & W/DYE: CPT | Mod: 26

## 2022-08-29 PROCEDURE — 99221 1ST HOSP IP/OBS SF/LOW 40: CPT

## 2022-08-29 PROCEDURE — 76770 US EXAM ABDO BACK WALL COMP: CPT | Mod: 26

## 2022-08-29 PROCEDURE — 99222 1ST HOSP IP/OBS MODERATE 55: CPT

## 2022-08-29 RX ORDER — SENNA PLUS 8.6 MG/1
2 TABLET ORAL AT BEDTIME
Refills: 0 | Status: DISCONTINUED | OUTPATIENT
Start: 2022-08-29 | End: 2022-09-04

## 2022-08-29 RX ORDER — IBUPROFEN 200 MG
400 TABLET ORAL EVERY 6 HOURS
Refills: 0 | Status: DISCONTINUED | OUTPATIENT
Start: 2022-08-29 | End: 2022-09-04

## 2022-08-29 RX ORDER — POLYETHYLENE GLYCOL 3350 17 G/17G
17 POWDER, FOR SOLUTION ORAL
Refills: 0 | Status: DISCONTINUED | OUTPATIENT
Start: 2022-08-29 | End: 2022-09-04

## 2022-08-29 RX ORDER — ACETAMINOPHEN 500 MG
650 TABLET ORAL EVERY 6 HOURS
Refills: 0 | Status: DISCONTINUED | OUTPATIENT
Start: 2022-08-29 | End: 2022-09-04

## 2022-08-29 RX ORDER — MULTIVIT WITH MIN/MFOLATE/K2 340-15/3 G
1 POWDER (GRAM) ORAL DAILY
Refills: 0 | Status: DISCONTINUED | OUTPATIENT
Start: 2022-08-29 | End: 2022-08-29

## 2022-08-29 RX ORDER — LACTULOSE 10 G/15ML
30 SOLUTION ORAL
Refills: 0 | Status: DISCONTINUED | OUTPATIENT
Start: 2022-08-29 | End: 2022-08-30

## 2022-08-29 RX ADMIN — CARBIDOPA AND LEVODOPA 1.5 TABLET(S): 25; 100 TABLET ORAL at 12:21

## 2022-08-29 RX ADMIN — CARBIDOPA AND LEVODOPA 1 TABLET(S): 25; 100 TABLET ORAL at 12:21

## 2022-08-29 RX ADMIN — ENOXAPARIN SODIUM 40 MILLIGRAM(S): 100 INJECTION SUBCUTANEOUS at 05:33

## 2022-08-29 RX ADMIN — ATORVASTATIN CALCIUM 20 MILLIGRAM(S): 80 TABLET, FILM COATED ORAL at 21:41

## 2022-08-29 RX ADMIN — POLYETHYLENE GLYCOL 3350 17 GRAM(S): 17 POWDER, FOR SOLUTION ORAL at 17:26

## 2022-08-29 RX ADMIN — AMLODIPINE BESYLATE 5 MILLIGRAM(S): 2.5 TABLET ORAL at 05:32

## 2022-08-29 RX ADMIN — Medication 400 MILLIGRAM(S): at 16:16

## 2022-08-29 RX ADMIN — LACTULOSE 30 GRAM(S): 10 SOLUTION ORAL at 17:25

## 2022-08-29 RX ADMIN — CARBIDOPA AND LEVODOPA 1 TABLET(S): 25; 100 TABLET ORAL at 23:39

## 2022-08-29 RX ADMIN — Medication 81 MILLIGRAM(S): at 12:21

## 2022-08-29 RX ADMIN — CARBIDOPA AND LEVODOPA 1 TABLET(S): 25; 100 TABLET ORAL at 05:32

## 2022-08-29 RX ADMIN — CARBIDOPA AND LEVODOPA 1.5 TABLET(S): 25; 100 TABLET ORAL at 05:32

## 2022-08-29 RX ADMIN — SENNA PLUS 2 TABLET(S): 8.6 TABLET ORAL at 21:41

## 2022-08-29 RX ADMIN — Medication 50 MILLIGRAM(S): at 05:33

## 2022-08-29 RX ADMIN — CARBIDOPA AND LEVODOPA 1 TABLET(S): 25; 100 TABLET ORAL at 17:25

## 2022-08-29 RX ADMIN — PANTOPRAZOLE SODIUM 40 MILLIGRAM(S): 20 TABLET, DELAYED RELEASE ORAL at 08:39

## 2022-08-29 RX ADMIN — SELEGILINE HYDROCHLORIDE 5 MILLIGRAM(S): 1.25 TABLET, ORALLY DISINTEGRATING ORAL at 05:32

## 2022-08-29 RX ADMIN — CARBIDOPA AND LEVODOPA 1.5 TABLET(S): 25; 100 TABLET ORAL at 21:23

## 2022-08-29 RX ADMIN — CARBIDOPA AND LEVODOPA 1 TABLET(S): 25; 100 TABLET ORAL at 00:03

## 2022-08-29 RX ADMIN — POLYETHYLENE GLYCOL 3350 17 GRAM(S): 17 POWDER, FOR SOLUTION ORAL at 00:03

## 2022-08-29 NOTE — OCCUPATIONAL THERAPY INITIAL EVALUATION ADULT - PATIENT PROFILE REVIEW, REHAB EVAL
[id number] Pt chart thoroughly reviewed prior to OT evaluation./yes
[id number] Pt chart thoroughly reviewed prior to OT evaluation./yes

## 2022-08-29 NOTE — PHYSICAL THERAPY INITIAL EVALUATION ADULT - LEVEL OF INDEPENDENCE: GAIT, REHAB EVAL
extreme back pain unable to initiate gait, patient returned to bed immediately after standing, attempted x 3./unable to perform

## 2022-08-29 NOTE — OCCUPATIONAL THERAPY INITIAL EVALUATION ADULT - PERTINENT HX OF CURRENT PROBLEM, REHAB EVAL
77 y M with a PMHx of Parkinson's disease, Hx of back surgery (2019), H/O cardiac stent placement 2005, H/O esophogeal ulcers HTN, Osteoporosis. The patient came in for evaluation of multiple falls and back pain.  The patient is accompanied by his wife stating he had two falls 2 weeks ago, one was down several steps and the other he fell and struck his right knee. The patient had a dizzy and lightheaded sensation prior to one of the falls, but there was no LOC, urinary/fecal incontinence or post episode confusion. The second one was purely mechanical when he slipped going up a flight of stairs. The patient has been participating in physical therapy for his on-going back issues and worsening Parkinson's disease, noting that his back pain has progressively worsened and has associated shooting pain down both legs. The patient came to ED today as his wife states its been very difficult for her to take care of him and he also started to develop urinary incontinence. The urinary incontinence started a few days ago. There is no overwhelming urge to urinate and patient looses urine all of a sudden without realizing it. The patient initially presented to ED south, Pan scan was performed demonstrating right 8th, left 9th rib fractures, acute fracture of right and left aspect of sacrum and several age indeterminate Thoracic compression fractures.
77 y M with a PMHx of Parkinson's disease, Hx of back surgery (2019), H/O cardiac stent placement 2005, H/O esophogeal ulcers HTN, Osteoporosis. The patient came in for evaluation of multiple falls and back pain.  The patient is accompanied by his wife stating he had two falls 2 weeks ago, one was down several steps and the other he fell and struck his right knee. The patient had a dizzy and lightheaded sensation prior to one of the falls, but there was no LOC, urinary/fecal incontinence or post episode confusion. The second one was purely mechanical when he slipped going up a flight of stairs. The patient has been participating in physical therapy for his on-going back issues and worsening Parkinson's disease, noting that his back pain has progressively worsened and has associated shooting pain down both legs. The patient came to ED today as his wife states its been very difficult for her to take care of him and he also started to develop urinary incontinence. The urinary incontinence started a few days ago. There is no overwhelming urge to urinate and patient looses urine all of a sudden without realizing it. The patient initially presented to ED south, Pan scan was performed demonstrating right 8th, left 9th rib fractures, acute fracture of right and left aspect of sacrum and several age indeterminate Thoracic compression fractures.

## 2022-08-29 NOTE — CONSULT NOTE ADULT - SUBJECTIVE AND OBJECTIVE BOX
Orthopaedic Surgery Consult Note    HPI:  77 y M with a PMHx of Parkinson's disease, Hx of back surgery (2019), H/O cardiac stent placement 2005, H/O esophogeal ulcers HTN, Osteoporosis. The patient came in for evaluation of multiple falls and back pain.  The patient is accompanied by his wife stating he had an initial fall on august 5th for which he fell from ground level onto his buttocks. He was able to ambulate immediately afterwards with no pain. Wife states he fell one week later on august 12th for which he landed on his right knee. He was also able to ambulate after this fall as well. Over the course of the next 2 weeks he complained of pain but was ambulating with his walker. Per wife last week he began having "urinary incontinence" and worsening back pain with refusal to go to the ED; he continued ambulating. two nights ago the patient was able to go down two flights of stairs in his home with some pain; he woke up yesterday morning with more low back pain and finally was agreeable to go to the ER yesterday.     Upon speaking with the patient he was able to interject and inform me that he is not incontinent, he has not urinated in the hospital bed in the 24 hours that he has been admitted because he was able to use the urinal in the bed, he was "incontinent" at home due to refusal to ambulate because of his back pain and not wanting to get out of bed. Patient denies any numbness/tinging/paresthesias in his bilateral lower extremities. He denies any pain elsewhere than his back.     Patients history discussed with his son dr. leon Renee, orthopedic surgeon in [facility], who states that a few months prior patient had fecal impaction that caused the patient so much back pain and discomfort that required manual disimpaction. Per patient he has not had a bowel movement in 8 days, denies being able to pass gas either.         Allergies  No Known Allergies  Intolerances    PAST MEDICAL & SURGICAL HISTORY:  Parkinsons  History of back surgery  H/O heart artery stent    MEDICATIONS  (STANDING):  amLODIPine   Tablet 5 milliGRAM(s) Oral daily  aspirin  chewable 81 milliGRAM(s) Oral daily  atorvastatin 20 milliGRAM(s) Oral at bedtime  carbidopa/levodopa  25/250 1 Tablet(s) Oral four times a day  carbidopa/levodopa CR 25/100 1.5 Tablet(s) Oral <User Schedule>  enoxaparin Injectable 40 milliGRAM(s) SubCutaneous every 24 hours  lactulose Syrup 30 Gram(s) Oral every 3 hours  metoprolol succinate ER 50 milliGRAM(s) Oral daily  pantoprazole    Tablet 40 milliGRAM(s) Oral before breakfast  polyethylene glycol 3350 17 Gram(s) Oral two times a day  saline laxative (FLEET) Rectal Enema 1 Enema Rectal daily  selegiline Oral Tab/Cap 5 milliGRAM(s) Oral <User Schedule>  senna 2 Tablet(s) Oral at bedtime    MEDICATIONS  (PRN):  acetaminophen     Tablet .. 650 milliGRAM(s) Oral every 6 hours PRN Mild Pain (1 - 3)  ibuprofen  Tablet. 400 milliGRAM(s) Oral every 6 hours PRN Moderate Pain (4 - 6)      Vital Signs Last 24 Hrs  T(C): 36.3 (29 Aug 2022 16:00), Max: 37.3 (29 Aug 2022 00:00)  T(F): 97.3 (29 Aug 2022 16:00), Max: 99.2 (29 Aug 2022 00:00)  HR: 72 (29 Aug 2022 16:00) (72 - 95)  BP: 96/53 (29 Aug 2022 16:00) (96/53 - 160/72)  BP(mean): --  RR: 18 (29 Aug 2022 16:00) (18 - 20)  SpO2: 96% (28 Aug 2022 20:10) (96% - 96%)    Parameters below as of 28 Aug 2022 20:10  Patient On (Oxygen Delivery Method): room air      Physical Exam:  Patient laying in bed , Wife bedside, AxOx3    Bilateral LE:  skin intact, no ecchymosis, no swelling, no effusions  FROM of bilateral hips/knees/ankles; minimal pain with hip ROM on the right hip; no pain on the left hip  no pelvic instability during pelvic ring compression; mild pain with ring compression worse on the right side  SILT sp/dp/t/sural/saph  firing quads/hamstrings/ta/ehl/fhl/gs                        9.9    7.17  )-----------( 345      ( 29 Aug 2022 07:28 )             30.8     08-29    133<L>  |  102  |  19  ----------------------------<  111<H>  4.3   |  24  |  0.6<L>    Ca    9.0      29 Aug 2022 07:28  Mg     2.1     08-29    TPro  6.3  /  Alb  3.5  /  TBili  0.3  /  DBili  x   /  AST  28  /  ALT  <5  /  AlkPhos  116<H>  08-29    PT/INR - ( 28 Aug 2022 16:14 )   PT: 12.80 sec;   INR: 1.11 ratio         PTT - ( 28 Aug 2022 16:14 )  PTT:39.7 sec      Imaging:  < from: CT Chest No Cont (08.28.22 @ 16:56) >  IMPRESSION:  Acute fractures within the right and left aspect of the sacrum.    Acute right eighth and left ninth rib fractures. Cortical irregularity of   the left anterior fourth fifth and sixth ribs indeterminate age    Severe T10 and moderate to severe at T5 vertebral body compression   fracture of indeterminate age. Severe compression deformity of L2   vertebral body of indeterminate age.    A/P: 77yMale with bilateral sacral fractures   -3D Reconstructions of pelvis CT  -Orthopedic service to discuss 3D reconstruction with [facility] radiologist in the morning   -NWB B/L LE until further imaging reviewed to determine type of sacral fracture  -plan to follow tomorrow after further review  -pain control  -bowel regimen due to patients constipation   -pain control  -Discussed with Dr. Tracey Orthopedic Trauma Attending

## 2022-08-29 NOTE — OCCUPATIONAL THERAPY INITIAL EVALUATION ADULT - NSACTIVITYREC_GEN_A_OT
Patient requires max assist/dependent assistance with activities of daily living. Transfers not assessed due to pt unable to tolerate upright sitting. OT recommends D/C subacute rehabilitation facility versus SNF when medically appropriate. OT recommendations for DME to be determined prior to D/C. Refer to evaluation for details.

## 2022-08-29 NOTE — OCCUPATIONAL THERAPY INITIAL EVALUATION ADULT - ADDITIONAL COMMENTS
As per pt report resides in split-level house with steps to enter. Enter on floor with kitchen and dining room. From dining room, 1 flight of steps down to den and 1 flight of steps up to living room. From living room, 1 flight up to bedroom/full bath. 1/2 bath on floor with den. Does not drive, but does go in community with family members.

## 2022-08-29 NOTE — OCCUPATIONAL THERAPY INITIAL EVALUATION ADULT - DIAGNOSIS, OT EVAL
Debility s/p fall; RIb fxs, sacrum fx/ R/O Cauda Equina Syndrome
Debility s/p fall; RIb fxs, sacrum fx

## 2022-08-29 NOTE — PROGRESS NOTE ADULT - SUBJECTIVE AND OBJECTIVE BOX
HPI:  77 y M with a PMHx of Parkinson's disease, Hx of back surgery (2019), H/O cardiac stent placement , H/O esophogeal ulcers HTN, Osteoporosis. The patient came in for evaluation of multiple falls and back pain.  The patient is accompanied by his wife stating he had two falls 2 weeks ago, one was down several steps and the other he fell and struck his right knee. The patient had a dizzy and lightheaded sensation prior to one of the falls, but there was no LOC, urinary/fecal incontinence or post episode confusion. The second one was purely mechanical when he slipped going up a flight of stairs. The patient has been participating in physical therapy for his on-going back issues and worsening Parkinson's disease, noting that his back pain has progressively worsened and has associated shooting pain down both legs. The patient came to ED today as his wife states its been very difficult for her to take care of him and he also started to develop urinary incontinence. The urinary incontinence started a few days ago. There is no overwhelming urge to urinate and patient looses urine all of a sudden without realizing it. The patient initially presented to ED south, Pan scan was performed demonstrating right 8th, left 9th rib fractures, acute fracture of right and left aspect of sacrum and several age indeterminate Thoracic compression fractures.    The patient was evaluated by trauma and neurosurgery teams and will be admitted to medicine for W/U of recurrent falls.    (28 Aug 2022 22:18)    Currently admitted to medicine with the primary diagnosis of Fall       Today is hospital day 1d.     INTERVAL HPI / OVERNIGHT EVENTS:  Patient was examined and seen at bedside. This morning he is resting comfortably in bed and reports no new issues or overnight events.     ROS: Otherwise unremarkable     PAST MEDICAL & SURGICAL HISTORY  Parkinsons    History of back surgery    H/O heart artery stent      ALLERGIES  No Known Allergies    MEDICATIONS  STANDING MEDICATIONS  amLODIPine   Tablet 5 milliGRAM(s) Oral daily  aspirin  chewable 81 milliGRAM(s) Oral daily  atorvastatin 20 milliGRAM(s) Oral at bedtime  carbidopa/levodopa  25/250 1 Tablet(s) Oral four times a day  carbidopa/levodopa CR 25/100 1.5 Tablet(s) Oral <User Schedule>  enoxaparin Injectable 40 milliGRAM(s) SubCutaneous every 24 hours  metoprolol succinate ER 50 milliGRAM(s) Oral daily  pantoprazole    Tablet 40 milliGRAM(s) Oral before breakfast  polyethylene glycol 3350 17 Gram(s) Oral two times a day  saline laxative (FLEET) Rectal Enema 1 Enema Rectal daily  selegiline Oral Tab/Cap 5 milliGRAM(s) Oral <User Schedule>  senna 2 Tablet(s) Oral at bedtime    PRN MEDICATIONS    VITALS:  T(F): 98.1  HR: 82  BP: 119/58  RR: 18  SpO2: 96%    PHYSICAL EXAM  GEN: NAD, Resting comfortably in bed  PULM: Clear to auscultation bilaterally, No wheezes  CVS: Regular rate and rhythm, S1-S2, no murmurs  ABD: Soft, non-tender, non-distended, no guarding  EXT: No edema  NEURO: A&Ox3, no focal deficits    LABS                        9.9    7.17  )-----------( 345      ( 29 Aug 2022 07:28 )             30.8     08    133<L>  |  102  |  19  ----------------------------<  111<H>  4.3   |  24  |  0.6<L>    Ca    9.0      29 Aug 2022 07:28  Mg     2.1         TPro  6.3  /  Alb  3.5  /  TBili  0.3  /  DBili  x   /  AST  28  /  ALT  <5  /  AlkPhos  116<H>  08-29    PT/INR - ( 28 Aug 2022 16:14 )   PT: 12.80 sec;   INR: 1.11 ratio         PTT - ( 28 Aug 2022 16:14 )  PTT:39.7 sec  Urinalysis Basic - ( 28 Aug 2022 18:35 )    Color: Yellow / Appearance: Clear / S.020 / pH: x  Gluc: x / Ketone: Negative  / Bili: Negative / Urobili: 0.2 mg/dL   Blood: x / Protein: Trace mg/dL / Nitrite: Negative   Leuk Esterase: Negative / RBC: Negative / WBC 1-2 /HPF   Sq Epi: x / Non Sq Epi: Negative / Bacteria: Negative    RADIOLOGY     CT chest/abdomen/pelvis - Acute b/l fractures within sacrum; acute R 8th and L 9th rib fx; cortical irregularity of L anterior 4/5/6th ribs; severe T10 and mod/severe T5 vertebral body compression fx of indeterminate age; severe compression deformity of L2 vertebral body of indeterminate age.     CT head - Numerous calvarial lucent lesion, increased since prior exam (2004) of indeterminate etiology     CT C-spine - Multilevel degenerative changes w/ mild retrolisthesis of C3 and C2 and anterolisthesis of C4 on C5; significant motion artifact

## 2022-08-29 NOTE — OCCUPATIONAL THERAPY INITIAL EVALUATION ADULT - LEVEL OF INDEPENDENCE: BED TO CHAIR, REHAB EVAL
Pt unable to tolerate short sitting on edge of bed. Pt performed supine to sit and immediately returned to supine in bed. C/o pain 10/10. Managed by returning to supine in bed and repositioning. Full relief./unable to perform

## 2022-08-29 NOTE — PHYSICAL THERAPY INITIAL EVALUATION ADULT - PERTINENT HX OF CURRENT PROBLEM, REHAB EVAL
77 y M with a PMHx of Parkinson's disease, Hx of back surgery (2019), H/O cardiac stent placement 2005, H/O esophogeal ulcers HTN, Osteoporosis. The patient came in for evaluation of multiple falls and back pain.  The patient is accompanied by his wife stating he had two falls 2 weeks ago, one was down several steps and the other he fell and struck his right knee. The patient had a dizzy and lightheaded sensation prior to one of the falls, but there was no LOC, urinary/fecal incontinence or post episode confusion. The second one was purely mechanical when he slipped going up a flight of stairs. The patient has been participating in physical therapy for his on-going back issues and worsening Parkinson's disease, noting that his back pain has progressively worsened and has associated shooting pain down both legs. The patient came to ED today as his wife states its been very difficult for her to take care of him and he also started to develop urinary incontinence.

## 2022-08-29 NOTE — OCCUPATIONAL THERAPY INITIAL EVALUATION ADULT - SITTING BALANCE: DYNAMIC
Unable to assess due to pt's inability to tolerate short sitting on edge of bed. See above for details regarding pain

## 2022-08-29 NOTE — OCCUPATIONAL THERAPY INITIAL EVALUATION ADULT - LEVEL OF INDEPENDENCE: STAND/SIT, REHAB EVAL
Pt unable to tolerate short sitting on edge of bed. Pt performed supine to sit and immediately returned to supine in bed. C/o pain 10/10. Managed by returning to supine in bed and repositioning. Full relief/unable to perform

## 2022-08-30 LAB
ALBUMIN SERPL ELPH-MCNC: 3.5 G/DL — SIGNIFICANT CHANGE UP (ref 3.5–5.2)
ALP SERPL-CCNC: 122 U/L — HIGH (ref 30–115)
ALT FLD-CCNC: <5 U/L — SIGNIFICANT CHANGE UP (ref 0–41)
ANION GAP SERPL CALC-SCNC: 11 MMOL/L — SIGNIFICANT CHANGE UP (ref 7–14)
AST SERPL-CCNC: 17 U/L — SIGNIFICANT CHANGE UP (ref 0–41)
BASOPHILS # BLD AUTO: 0.05 K/UL — SIGNIFICANT CHANGE UP (ref 0–0.2)
BASOPHILS NFR BLD AUTO: 0.7 % — SIGNIFICANT CHANGE UP (ref 0–1)
BILIRUB SERPL-MCNC: 0.3 MG/DL — SIGNIFICANT CHANGE UP (ref 0.2–1.2)
BUN SERPL-MCNC: 23 MG/DL — HIGH (ref 10–20)
CALCIUM SERPL-MCNC: 9.2 MG/DL — SIGNIFICANT CHANGE UP (ref 8.5–10.1)
CHLORIDE SERPL-SCNC: 101 MMOL/L — SIGNIFICANT CHANGE UP (ref 98–110)
CO2 SERPL-SCNC: 23 MMOL/L — SIGNIFICANT CHANGE UP (ref 17–32)
CREAT SERPL-MCNC: 0.7 MG/DL — SIGNIFICANT CHANGE UP (ref 0.7–1.5)
CRP SERPL-MCNC: 15.8 MG/L — HIGH
EGFR: 94 ML/MIN/1.73M2 — SIGNIFICANT CHANGE UP
EOSINOPHIL # BLD AUTO: 0.08 K/UL — SIGNIFICANT CHANGE UP (ref 0–0.7)
EOSINOPHIL NFR BLD AUTO: 1.2 % — SIGNIFICANT CHANGE UP (ref 0–8)
ERYTHROCYTE [SEDIMENTATION RATE] IN BLOOD: 65 MM/HR — HIGH (ref 0–10)
GLUCOSE SERPL-MCNC: 111 MG/DL — HIGH (ref 70–99)
HCT VFR BLD CALC: 30.7 % — LOW (ref 42–52)
HGB BLD-MCNC: 10 G/DL — LOW (ref 14–18)
IMM GRANULOCYTES NFR BLD AUTO: 1.4 % — HIGH (ref 0.1–0.3)
IRON SATN MFR SERPL: 11 % — LOW (ref 15–50)
IRON SATN MFR SERPL: 26 UG/DL — LOW (ref 35–150)
LYMPHOCYTES # BLD AUTO: 1.45 K/UL — SIGNIFICANT CHANGE UP (ref 1.2–3.4)
LYMPHOCYTES # BLD AUTO: 21 % — SIGNIFICANT CHANGE UP (ref 20.5–51.1)
MAGNESIUM SERPL-MCNC: 2.2 MG/DL — SIGNIFICANT CHANGE UP (ref 1.8–2.4)
MCHC RBC-ENTMCNC: 27.3 PG — SIGNIFICANT CHANGE UP (ref 27–31)
MCHC RBC-ENTMCNC: 32.6 G/DL — SIGNIFICANT CHANGE UP (ref 32–37)
MCV RBC AUTO: 83.9 FL — SIGNIFICANT CHANGE UP (ref 80–94)
MONOCYTES # BLD AUTO: 0.92 K/UL — HIGH (ref 0.1–0.6)
MONOCYTES NFR BLD AUTO: 13.3 % — HIGH (ref 1.7–9.3)
NEUTROPHILS # BLD AUTO: 4.32 K/UL — SIGNIFICANT CHANGE UP (ref 1.4–6.5)
NEUTROPHILS NFR BLD AUTO: 62.4 % — SIGNIFICANT CHANGE UP (ref 42.2–75.2)
NRBC # BLD: 0 /100 WBCS — SIGNIFICANT CHANGE UP (ref 0–0)
PLATELET # BLD AUTO: 315 K/UL — SIGNIFICANT CHANGE UP (ref 130–400)
POTASSIUM SERPL-MCNC: 4.6 MMOL/L — SIGNIFICANT CHANGE UP (ref 3.5–5)
POTASSIUM SERPL-SCNC: 4.6 MMOL/L — SIGNIFICANT CHANGE UP (ref 3.5–5)
PROT SERPL-MCNC: 6.4 G/DL — SIGNIFICANT CHANGE UP (ref 6–8)
RBC # BLD: 3.66 M/UL — LOW (ref 4.7–6.1)
RBC # FLD: 13.6 % — SIGNIFICANT CHANGE UP (ref 11.5–14.5)
SODIUM SERPL-SCNC: 135 MMOL/L — SIGNIFICANT CHANGE UP (ref 135–146)
TIBC SERPL-MCNC: 229 UG/DL — SIGNIFICANT CHANGE UP (ref 220–430)
TRANSFERRIN SERPL-MCNC: 198 MG/DL — LOW (ref 200–360)
UIBC SERPL-MCNC: 203 UG/DL — SIGNIFICANT CHANGE UP (ref 110–370)
WBC # BLD: 6.92 K/UL — SIGNIFICANT CHANGE UP (ref 4.8–10.8)
WBC # FLD AUTO: 6.92 K/UL — SIGNIFICANT CHANGE UP (ref 4.8–10.8)

## 2022-08-30 PROCEDURE — 99232 SBSQ HOSP IP/OBS MODERATE 35: CPT

## 2022-08-30 PROCEDURE — 99222 1ST HOSP IP/OBS MODERATE 55: CPT

## 2022-08-30 PROCEDURE — 74018 RADEX ABDOMEN 1 VIEW: CPT | Mod: 26

## 2022-08-30 RX ORDER — ENOXAPARIN SODIUM 100 MG/ML
40 INJECTION SUBCUTANEOUS EVERY 24 HOURS
Refills: 0 | Status: DISCONTINUED | OUTPATIENT
Start: 2022-08-30 | End: 2022-09-04

## 2022-08-30 RX ORDER — ASPIRIN/CALCIUM CARB/MAGNESIUM 324 MG
81 TABLET ORAL DAILY
Refills: 0 | Status: DISCONTINUED | OUTPATIENT
Start: 2022-08-30 | End: 2022-09-02

## 2022-08-30 RX ORDER — LACTULOSE 10 G/15ML
30 SOLUTION ORAL
Refills: 0 | Status: DISCONTINUED | OUTPATIENT
Start: 2022-08-30 | End: 2022-08-30

## 2022-08-30 RX ADMIN — Medication 650 MILLIGRAM(S): at 08:15

## 2022-08-30 RX ADMIN — CARBIDOPA AND LEVODOPA 1 TABLET(S): 25; 100 TABLET ORAL at 17:13

## 2022-08-30 RX ADMIN — Medication 400 MILLIGRAM(S): at 14:01

## 2022-08-30 RX ADMIN — SENNA PLUS 2 TABLET(S): 8.6 TABLET ORAL at 21:49

## 2022-08-30 RX ADMIN — Medication 650 MILLIGRAM(S): at 08:39

## 2022-08-30 RX ADMIN — Medication 50 MILLIGRAM(S): at 05:59

## 2022-08-30 RX ADMIN — ATORVASTATIN CALCIUM 20 MILLIGRAM(S): 80 TABLET, FILM COATED ORAL at 21:49

## 2022-08-30 RX ADMIN — PANTOPRAZOLE SODIUM 40 MILLIGRAM(S): 20 TABLET, DELAYED RELEASE ORAL at 06:17

## 2022-08-30 RX ADMIN — CARBIDOPA AND LEVODOPA 1.5 TABLET(S): 25; 100 TABLET ORAL at 13:59

## 2022-08-30 RX ADMIN — SELEGILINE HYDROCHLORIDE 5 MILLIGRAM(S): 1.25 TABLET, ORALLY DISINTEGRATING ORAL at 05:59

## 2022-08-30 RX ADMIN — CARBIDOPA AND LEVODOPA 1 TABLET(S): 25; 100 TABLET ORAL at 05:59

## 2022-08-30 RX ADMIN — Medication 400 MILLIGRAM(S): at 14:40

## 2022-08-30 RX ADMIN — Medication 650 MILLIGRAM(S): at 21:49

## 2022-08-30 RX ADMIN — AMLODIPINE BESYLATE 5 MILLIGRAM(S): 2.5 TABLET ORAL at 06:00

## 2022-08-30 RX ADMIN — ENOXAPARIN SODIUM 40 MILLIGRAM(S): 100 INJECTION SUBCUTANEOUS at 05:59

## 2022-08-30 RX ADMIN — POLYETHYLENE GLYCOL 3350 17 GRAM(S): 17 POWDER, FOR SOLUTION ORAL at 06:17

## 2022-08-30 RX ADMIN — CARBIDOPA AND LEVODOPA 1.5 TABLET(S): 25; 100 TABLET ORAL at 06:00

## 2022-08-30 RX ADMIN — Medication 400 MILLIGRAM(S): at 21:50

## 2022-08-30 RX ADMIN — CARBIDOPA AND LEVODOPA 1 TABLET(S): 25; 100 TABLET ORAL at 11:57

## 2022-08-30 RX ADMIN — CARBIDOPA AND LEVODOPA 1.5 TABLET(S): 25; 100 TABLET ORAL at 21:49

## 2022-08-30 RX ADMIN — CARBIDOPA AND LEVODOPA 1 TABLET(S): 25; 100 TABLET ORAL at 23:31

## 2022-08-30 RX ADMIN — Medication 81 MILLIGRAM(S): at 17:13

## 2022-08-30 RX ADMIN — ENOXAPARIN SODIUM 40 MILLIGRAM(S): 100 INJECTION SUBCUTANEOUS at 21:50

## 2022-08-30 NOTE — DIETITIAN INITIAL EVALUATION ADULT - ADD RECOMMEND
1. Recommend to add Ensure Enlive 3X/DAILY -- will provide 1050 kcal/day total, 60g pro/day total   2. Continue on current diet order  3. Encourage PO intake and assist with meals as needed     Pt is at high risk; will f/u in 4 days.

## 2022-08-30 NOTE — DIETITIAN INITIAL EVALUATION ADULT - NAME AND PHONE
Radha [phone]    Nutrition Intervention: meals, snacks, medical food supplements; Nutrition Monitoring: diet order, PO intake, weights, labs, NFPF, body composition, BM and tolerance to medical food supplements

## 2022-08-30 NOTE — PROGRESS NOTE ADULT - SUBJECTIVE AND OBJECTIVE BOX
Neurosurgery Pre-Op Note:     Surgery: L5-Ilium fusion      No Known Allergies      T(C): 36.2 (08-30-22 @ 07:57), Max: 36.3 (08-29-22 @ 16:00)  HR: 72 (08-30-22 @ 07:57) (72 - 89)  BP: 127/65 (08-30-22 @ 07:57) (96/53 - 169/79)  RR: 18 (08-30-22 @ 07:57) (18 - 18)  SpO2: --  Wt(kg): --        08-30    135  |  101  |  23<H>  ----------------------------<  111<H>  4.6   |  23  |  0.7    Ca    9.2      30 Aug 2022 06:48  Mg     2.2     08-30    TPro  6.4  /  Alb  3.5  /  TBili  0.3  /  DBili  x   /  AST  17  /  ALT  <5  /  AlkPhos  122<H>  08-30    CBC Full  -  ( 30 Aug 2022 06:48 )  WBC Count : 6.92 K/uL  RBC Count : 3.66 M/uL  Hemoglobin : 10.0 g/dL  Hematocrit : 30.7 %  Platelet Count - Automated : 315 K/uL  Mean Cell Volume : 83.9 fL  Mean Cell Hemoglobin : 27.3 pg  Mean Cell Hemoglobin Concentration : 32.6 g/dL  Auto Neutrophil # : 4.32 K/uL  Auto Lymphocyte # : 1.45 K/uL  Auto Monocyte # : 0.92 K/uL  Auto Eosinophil # : 0.08 K/uL  Auto Basophil # : 0.05 K/uL  Auto Neutrophil % : 62.4 %  Auto Lymphocyte % : 21.0 %  Auto Monocyte % : 13.3 %  Auto Eosinophil % : 1.2 %  Auto Basophil % : 0.7 %    PT/INR - ( 28 Aug 2022 16:14 )   PT: 12.80 sec;   INR: 1.11 ratio         PTT - ( 28 Aug 2022 16:14 )  PTT:39.7 sec    Type & Screen (in past 72hrs): A Pos 8/28/22    CXR: < from: CT Chest No Cont (08.28.22 @ 16:56) >  IMPRESSION:  Acute fractures within the right and left aspect of the sacrum.    Acute right eighth and left ninth rib fractures. Cortical irregularity of   the left anterior fourth fifth and sixth ribs indeterminate age    Severe T10 and moderate to severe at T5 vertebral body compression   fracture of indeterminate age. Severe compression deformity of L2   vertebral body of indeterminate age.      EKG: < from: 12 Lead ECG (08.29.22 @ 07:52) >  Diagnosis Line Normal sinus rhythm  Normal ECG    COVID negative 8/28/22    Imaging:  < from: MR Lumbar Spine w/wo IV Cont (08.29.22 @ 21:25) >  IMPRESSION:    1.  Study limited by artifact.    2.  Postoperative changes.    3.  Multiple compression fractures of varying ages likely secondary to   osteopenia, consider the possibility of multiple myeloma.    4.  Acute bilateral sacral fractures.    5.  Rounded focus of abnormal signal intensity and heterogeneous   enhancement anterior to the left sacroiliac joint and visualized left   iliac bone, involving the left iliacus muscle, incompletely imaged,   consider the possibility of hemorrhage and/or infection, MRI of the   pelvis without and with contrast is recommended for further evaluation.    6.  Abnormal signal and heterogeneous enhancement within the dorsal   paraspinal muscles consistent with myositis (infectious or inflammatory).    7.  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    8.  L1-L2; annular disc bulge and retropulsion of bone into the spinal   canal with compression of the thecal sac and bilateral foraminal   narrowing.    9.  L3-L4; annular disc bulge with compression of the thecal sac,   degenerative changes, and bilateral foraminal narrowing.    10.  L4-L5; retropulsion ofbone into the spinal canal, central spinal   stenosis, bilateral foraminal narrowing, and left lateral recess   narrowing.    11.  L5-S1; central spinal stenosis, bilateral foraminal narrowing, and   bilateral lateral recess narrowing.    < from: MR Thoracic Spine w/wo IV Cont (08.29.22 @ 21:22) >  IMPRESSION:    1.  Study limited by motion artifact. Evaluation of abnormal signal in   the thoracic spinal cord is suboptimal.    2.  No MRI evidence ofthoracic spinal cord compression.    3.  Postoperative changes.    4.  Multiple compression fractures of varying ages, may be secondary to   osteopenia, consider the possibility of multiple myeloma.    5.  Severe acute compression fracture of T10 with retropulsion of bone   into the spinal canal.    6.  T7-T8 and T8-T9: Degenerative changes.                 Assessment/Plan:  77 year-old male going to OR tomorrow for L5-Ilium fusion with Dr. Pal  -NPO after MN  -Please hold AM doses of ASA81 and Lovenox   -F/u medical clearance  -Consent: To be obtained by Attending

## 2022-08-30 NOTE — DIETITIAN INITIAL EVALUATION ADULT - PERTINENT MEDS FT
MEDICATIONS  (STANDING):  amLODIPine   Tablet 5 milliGRAM(s) Oral daily  atorvastatin 20 milliGRAM(s) Oral at bedtime  carbidopa/levodopa  25/250 1 Tablet(s) Oral four times a day  carbidopa/levodopa CR 25/100 1.5 Tablet(s) Oral <User Schedule>  metoprolol succinate ER 50 milliGRAM(s) Oral daily  pantoprazole    Tablet 40 milliGRAM(s) Oral before breakfast  polyethylene glycol 3350 17 Gram(s) Oral two times a day  selegiline Oral Tab/Cap 5 milliGRAM(s) Oral <User Schedule>  senna 2 Tablet(s) Oral at bedtime    MEDICATIONS  (PRN):  acetaminophen     Tablet .. 650 milliGRAM(s) Oral every 6 hours PRN Mild Pain (1 - 3)  ibuprofen  Tablet. 400 milliGRAM(s) Oral every 6 hours PRN Moderate Pain (4 - 6)

## 2022-08-30 NOTE — DIETITIAN INITIAL EVALUATION ADULT - OTHER CALCULATIONS
Using ABW 54.4 KG and IBW 64.5 KG: ENERGY: 1595-5054 kcal/day (MSJ 1.2-1.5 SF) vs. 1505-3123 kcal/day (30-35 kcal/kg); PROTEIN: 77-97 g/day (1.2-1.5 g/kg IBW); FLUID: 8217-9627 mL/day (25-30 mL/kg IBW) -- with consideration for BMI<19, age

## 2022-08-30 NOTE — CONSULT NOTE ADULT - ATTENDING COMMENTS
PT examined on 8/30/22 for question of myositis in paraspinals on an L-spine MRI w/wo contrast.  When examined he has ability to sense when he has to urinate, has no spinal sensory level.  Parkinsonism is present and patient has dyskinesias related to high dose of sinemet.    L-spine MR reviewed and seems to predominantly show post-operative changes.  CK should be checked.  He has weakness in hip flexors bilaterally L > R.  Wife indicates ambulation is stable.      Would obtain pelvic MRI w/wo contrast as suggested by radiologist.    I spoke with pt's son who is going to have an outside review of the lumbar spine MRI results indicating concern for myositis.

## 2022-08-30 NOTE — DIETITIAN INITIAL EVALUATION ADULT - PERTINENT LABORATORY DATA
08-30    135  |  101  |  23<H>  ----------------------------<  111<H>  4.6   |  23  |  0.7    Ca    9.2      30 Aug 2022 06:48  Mg     2.2     08-30    TPro  6.4  /  Alb  3.5  /  TBili  0.3  /  DBili  x   /  AST  17  /  ALT  <5  /  AlkPhos  122<H>  08-30

## 2022-08-30 NOTE — PROGRESS NOTE ADULT - SUBJECTIVE AND OBJECTIVE BOX
Patient is a 77y old  Male who presents with a chief complaint of FALL; SACRAL FRACTURE; RIB FRACTURES; SPINAL COMPRESSION FRACTURE; UNABLE     (30 Aug 2022 13:48)      Patient seen and examined at bedside.    ALLERGIES:  No Known Allergies    MEDICATIONS:  acetaminophen     Tablet .. 650 milliGRAM(s) Oral every 6 hours PRN  amLODIPine   Tablet 5 milliGRAM(s) Oral daily  aspirin  chewable 81 milliGRAM(s) Oral daily  atorvastatin 20 milliGRAM(s) Oral at bedtime  carbidopa/levodopa  25/250 1 Tablet(s) Oral four times a day  carbidopa/levodopa CR 25/100 1.5 Tablet(s) Oral <User Schedule>  enoxaparin Injectable 40 milliGRAM(s) SubCutaneous every 24 hours  ibuprofen  Tablet. 400 milliGRAM(s) Oral every 6 hours PRN  metoprolol succinate ER 50 milliGRAM(s) Oral daily  pantoprazole    Tablet 40 milliGRAM(s) Oral before breakfast  polyethylene glycol 3350 17 Gram(s) Oral two times a day  selegiline Oral Tab/Cap 5 milliGRAM(s) Oral <User Schedule>  senna 2 Tablet(s) Oral at bedtime    Vital Signs Last 24 Hrs  T(F): 97.1 (30 Aug 2022 07:57), Max: 97.3 (29 Aug 2022 16:00)  HR: 72 (30 Aug 2022 07:57) (72 - 89)  BP: 127/65 (30 Aug 2022 07:57) (96/53 - 169/79)  RR: 18 (30 Aug 2022 07:57) (18 - 18)  SpO2: --  I&O's Summary      PHYSICAL EXAM:  General: NAD, A/O x 3  ENT: MMM  Neck: Supple, No JVD  Lungs: diminished lung sounds   Cardio: RRR, S1/S2, No murmurs  Abdomen: Soft, Nontender, Nondistended; Bowel sounds present  Extremities: No cyanosis, No edema    LABS:                        10.0   6.92  )-----------( 315      ( 30 Aug 2022 06:48 )             30.7     08-30    135  |  101  |  23  ----------------------------<  111  4.6   |  23  |  0.7    Ca    9.2      30 Aug 2022 06:48  Mg     2.2     08-    TPro  6.4  /  Alb  3.5  /  TBili  0.3  /  DBili  x   /  AST  17  /  ALT  <5  /  AlkPhos  122  -      PT/INR - ( 28 Aug 2022 16:14 )   PT: 12.80 sec;   INR: 1.11 ratio         PTT - ( 28 Aug 2022 16:14 )  PTT:39.7 sec                          Urinalysis Basic - ( 28 Aug 2022 18:35 )    Color: Yellow / Appearance: Clear / S.020 / pH: x  Gluc: x / Ketone: Negative  / Bili: Negative / Urobili: 0.2 mg/dL   Blood: x / Protein: Trace mg/dL / Nitrite: Negative   Leuk Esterase: Negative / RBC: Negative / WBC 1-2 /HPF   Sq Epi: x / Non Sq Epi: Negative / Bacteria: Negative        Culture - Urine (collected 28 Aug 2022 18:36)  Source: Clean Catch Clean Catch (Midstream)  Final Report (29 Aug 2022 23:01):    <10,000 CFU/mL Normal Urogenital Mely      COVID-19 PCR: NotDetec (22 @ 17:31)      RADIOLOGY & ADDITIONAL TESTS:  < from: MR Lumbar Spine w/wo IV Cont (22 @ 21:25) >  1.  Study limited by artifact.    2.  Postoperative changes.    3.  Multiple compression fractures of varying ages likely secondary to   osteopenia, consider the possibility of multiple myeloma.    4.  Acute bilateral sacral fractures.    5.  Rounded focus of abnormal signal intensity and heterogeneous   enhancement anterior to the left sacroiliac joint and visualized left   iliac bone, involving the left iliacus muscle, incompletely imaged,   consider the possibility of hemorrhage and/or infection, MRI of the   pelvis without and with contrast is recommended for further evaluation.    6.  Abnormal signal and heterogeneous enhancement within the dorsal   paraspinal muscles consistent with myositis (infectious or inflammatory).    7.  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    8.  L1-L2; annular disc bulge and retropulsion of bone into the spinal   canal with compression of the thecal sac and bilateral foraminal   narrowing.    9.  L3-L4; annular disc bulge with compression of the thecal sac,   degenerative changes, and bilateral foraminal narrowing.    10.  L4-L5; retropulsion ofbone into the spinal canal, central spinal   stenosis, bilateral foraminal narrowing, and left lateral recess   narrowing.    11.  L5-S1; central spinal stenosis, bilateral foraminal narrowing, and   bilateral lateral recess narrowing.    < end of copied text >    Care Discussed with Consultants/Other Providers:

## 2022-08-30 NOTE — DIETITIAN INITIAL EVALUATION ADULT - ORAL NUTRITION SUPPLEMENTS
Ensure Enlive 3X/DAILY to optimize kcal/pro intake -- will provide 1050 kcal/day total, 60g pro/day total

## 2022-08-30 NOTE — CONSULT NOTE ADULT - ASSESSMENT
77 y M with a PMHx of Parkinson's disease, Hx of back surgery (2019), H/O cardiac stent placement 2005, H/O esophogeal ulcers HTN, Osteoporosis is admitted after repeated fall and back pain, found to have multiple vertebrae fracture and sacral fracture. Neurology is being consulted for abnormal enhancement in paraspinal muscle. Imaging, personally reviewed by attending , discussed with neurosurgery, Unlikely to be infectious or other cause, most likely postoperative change. Spoke to patient and his wife at bedside. patient's son is an orthopedic surgeon in [facility] ( Dr. Latrell Hernandez MD), they would like to go to [facility] if any procedure is warranted. For Parkinsons Disease, patient follows with Dr. Galindo in  [facility].     Recommendations:  - May obtain CPK, aldolase level.  - Rest of the management as per Neurosurgery and Orthopedic team  -

## 2022-08-30 NOTE — DIETITIAN INITIAL EVALUATION ADULT - NS FNS DIET ORDER
Diet, NPO after Midnight:      NPO Start Date: 30-Aug-2022,   NPO Start Time: 23:59 (08-30-22 @ 11:26) [Active]  Diet, DASH/TLC:   Sodium & Cholesterol Restricted (08-28-22 @ 23:38) [Active]

## 2022-08-30 NOTE — PROGRESS NOTE ADULT - SUBJECTIVE AND OBJECTIVE BOX
HPI:  77 y M with a PMHx of Parkinson's disease, Hx of back surgery (2019), H/O cardiac stent placement , H/O esophogeal ulcers HTN, Osteoporosis. The patient came in for evaluation of multiple falls and back pain.  The patient is accompanied by his wife stating he had two falls 2 weeks ago, one was down several steps and the other he fell and struck his right knee. The patient had a dizzy and lightheaded sensation prior to one of the falls, but there was no LOC, urinary/fecal incontinence or post episode confusion. The second one was purely mechanical when he slipped going up a flight of stairs. The patient has been participating in physical therapy for his on-going back issues and worsening Parkinson's disease, noting that his back pain has progressively worsened and has associated shooting pain down both legs. The patient came to ED today as his wife states its been very difficult for her to take care of him and he also started to develop urinary incontinence. The urinary incontinence started a few days ago. There is no overwhelming urge to urinate and patient looses urine all of a sudden without realizing it. The patient [place] ED [place], Pan scan was performed demonstrating right 8th, left 9th rib fractures, acute fracture of right and left aspect of sacrum and several age indeterminate Thoracic compression fractures.    The patient was evaluated by trauma and neurosurgery teams and will be admitted to medicine for W/U of recurrent falls.    (28 Aug 2022 22:18)    Currently admitted to medicine with the primary diagnosis of Fall       Today is hospital day 2d.     INTERVAL HPI / OVERNIGHT EVENTS:  Patient was examined and seen at bedside. This morning he is resting comfortably in bed and reports no new issues or overnight events.     ROS: Otherwise unremarkable     PAST MEDICAL & SURGICAL HISTORY  Parkinsons    History of back surgery    H/O heart artery stent      ALLERGIES  No Known Allergies    MEDICATIONS  STANDING MEDICATIONS  amLODIPine   Tablet 5 milliGRAM(s) Oral daily  aspirin  chewable 81 milliGRAM(s) Oral daily  atorvastatin 20 milliGRAM(s) Oral at bedtime  carbidopa/levodopa  25/250 1 Tablet(s) Oral four times a day  carbidopa/levodopa CR 25/100 1.5 Tablet(s) Oral <User Schedule>  enoxaparin Injectable 40 milliGRAM(s) SubCutaneous every 24 hours  metoprolol succinate ER 50 milliGRAM(s) Oral daily  pantoprazole    Tablet 40 milliGRAM(s) Oral before breakfast  polyethylene glycol 3350 17 Gram(s) Oral two times a day  selegiline Oral Tab/Cap 5 milliGRAM(s) Oral <User Schedule>  senna 2 Tablet(s) Oral at bedtime    PRN MEDICATIONS  acetaminophen     Tablet .. 650 milliGRAM(s) Oral every 6 hours PRN  ibuprofen  Tablet. 400 milliGRAM(s) Oral every 6 hours PRN    VITALS:  T(F): 97.4  HR: 75  BP: 121/56  RR: 18  SpO2: --    PHYSICAL EXAM  GEN: NAD, Resting comfortably in bed  PULM: Clear to auscultation bilaterally, No wheezes  CVS: Regular rate and rhythm, S1-S2, no murmurs  ABD: Soft, non-tender, non-distended, no guarding  EXT: No edema  NEURO: A&Ox3, no focal deficits      LABS                        10.0   6.92  )-----------( 315      ( 30 Aug 2022 06:48 )             30.7     08-30    135  |  101  |  23<H>  ----------------------------<  111<H>  4.6   |  23  |  0.7    Ca    9.2      30 Aug 2022 06:48  Mg     2.2         TPro  6.4  /  Alb  3.5  /  TBili  0.3  /  DBili  x   /  AST  17  /  ALT  <5  /  AlkPhos  122<H>  08-30      Urinalysis Basic - ( 28 Aug 2022 18:35 )    Color: Yellow / Appearance: Clear / S.020 / pH: x  Gluc: x / Ketone: Negative  / Bili: Negative / Urobili: 0.2 mg/dL   Blood: x / Protein: Trace mg/dL / Nitrite: Negative   Leuk Esterase: Negative / RBC: Negative / WBC 1-2 /HPF   Sq Epi: x / Non Sq Epi: Negative / Bacteria: Negative            Culture - Urine (collected 28 Aug 2022 18:36)  Source: Clean Catch Clean Catch (Midstream)  Final Report (29 Aug 2022 23:01):    <10,000 CFU/mL Normal Urogenital Mely

## 2022-08-30 NOTE — PRE-ANESTHESIA EVALUATION ADULT - NSANTHPMHFT_GEN_ALL_CORE
77 y M w/ PMHx of Parkinson's, back surgery (2019), cardiac stent placement (2005) presents for evaluation of multiple falls w/ back pain.    # Back pain S/P Fall R/O cauda Equina syndrome  # Acute b/l sacral fractures  - CT showed Severe T10 and moderate to severe at T5 vertebral body compression fracture of indeterminate age. Severe compression deformity of L2 vertebral body of indeterminate age.  - MRI T/L spine- multiple compression fx concern for MM, acute bilateral sacral fractures, concern for myositis     - UPEP/SPEP ordered, ortho consulted, neuro consulted, Neurosurgery spoke with pt's son Dr. Gomez - a orthopedic doc - now no plans for neurosurgery at [facility]  - Abdominal binder / TLSO   - Pain control   - CT head does not show acute bleed.   - EKG  - Fall precautions   - PT/OT  - Orthostatics    # Urinary incontinency - improving after large bowel movement   - Likely Overflow based on hx  - Kidney bladder US with PVR  - 240 PVR    # Parkinson's disease  - C/W home parkinsons medication    # HTN  - C/W amlodipine 5 mg PO QD      # CAD S/P PCI  - C/W ASA  - Lipitor 20 mg PO QD  - Metoprolol succinate 50 mg PO QD    # Anemia   - Unknown baseline  - send iron studies  - B12, Folate -wnl    # hypoNa -resolved   - Serum and urine osmolarity  - Urine sodium  - Encourage PO intake   - Repeat Na    # Elevated Alk Phos  - Likely 2/2 to bone etiology  - GGT wnl    # Constipation  - Miralax BID  - Senna qhs  - 8/30 - pt had good amount bowel movement    # Misc  - DVT Prophylaxis: enoxaparin Injectable  - GI Prophylaxis: pantoprazole    Tablet 40 milliGRAM(s) Oral before breakfast  - Diet: Diet, DASH/TLC  - Code Status: Full

## 2022-08-30 NOTE — CHART NOTE - NSCHARTNOTEFT_GEN_A_CORE
Tertiary Trauma Survey (TTS)    Date of TTS: 22 @ 00:35   Admit Date: 22  Trauma Activation: CODE / ALERT / CONSULT  Admit GCS:        DARIEL-     TAMIA-     M-     77 y M with a PMHx of Parkinson's disease, Hx of back surgery (2019), H/O cardiac stent placement , H/O esophogeal ulcers HTN, Osteoporosis. The patient came in for evaluation of multiple falls and back pain.  The patient is accompanied by his wife stating he had two falls 2 weeks ago, one was down several steps and the other he fell and struck his right knee. The patient had a dizzy and lightheaded sensation prior to one of the falls, but there was no LOC, urinary/fecal incontinence or post episode confusion. The second one was purely mechanical when he slipped going up a flight of stairs. The patient has been participating in physical therapy for his on-going back issues and worsening Parkinson's disease, noting that his back pain has progressively worsened and has associated shooting pain down both legs. The patient came to ED today as his wife states its been very difficult for her to take care of him and he also started to develop urinary incontinence. The urinary incontinence started a few days ago. There is no overwhelming urge to urinate and patient looses urine all of a sudden without realizing it. The patient initially presented to ED [place], Pan scan was performed demonstrating right 8th, left 9th rib fractures, acute fracture of right and left aspect of sacrum and several age indeterminate Thoracic compression fractures.    The patient was evaluated by trauma and neurosurgery teams and will be admitted to medicine for W/U of recurrent falls.    (28 Aug 2022 22:18)    Patient seen and examined.     PHYSICAL EXAM:  General: NAD  HEENT: Normocephalic, atraumatic, EOMI, PEERLA. no scalp lacerations   Neck: Soft, midline trachea. no c-spine tenderness  Chest: No chest wall tenderness, no subcutaneous emphysema   Cardiac: S1, S2, RRR  Respiratory: Bilateral breath sounds, clear and equal bilaterally  Abdomen: Soft, non-distended, non-tender, no rebound, no guarding.  Groin: Normal appearing, pelvis stable   Ext:  Moving b/l upper and lower extremities. Palpable Radial b/l UE, b/l DP palpable in LE.   Back: No T/L/S spine tenderness, No palpable runoff/stepoff/deformity    Vital Signs Last 24 Hrs  T(C): 36.3 (29 Aug 2022 16:00), Max: 36.7 (29 Aug 2022 07:50)  T(F): 97.3 (29 Aug 2022 16:00), Max: 98.1 (29 Aug 2022 07:50)  HR: 89 (30 Aug 2022 00:09) (72 - 91)  BP: 130/67 (30 Aug 2022 00:09) (96/53 - 169/79)  BP(mean): --  RR: 18 (29 Aug 2022 23:39) (18 - 18)  SpO2: --        Labs:  CAPILLARY BLOOD GLUCOSE                              9.9    7.17  )-----------( 345      ( 29 Aug 2022 07:28 )             30.8             133<L>  |  102  |  19  ----------------------------<  111<H>  4.3   |  24  |  0.6<L>      Calcium, Total Serum: 9.0 mg/dL (22 @ 07:28)      LFTs:             6.3  | 0.3  | 28       ------------------[116     ( 29 Aug 2022 07:28 )  3.5  | x    | <5          Lipase:x      Amylase:x             Coags:     12.80  ----< 1.11    ( 28 Aug 2022 16:14 )     39.7                Urinalysis Basic - ( 28 Aug 2022 18:35 )    Color: Yellow / Appearance: Clear / S.020 / pH: x  Gluc: x / Ketone: Negative  / Bili: Negative / Urobili: 0.2 mg/dL   Blood: x / Protein: Trace mg/dL / Nitrite: Negative   Leuk Esterase: Negative / RBC: Negative / WBC 1-2 /HPF   Sq Epi: x / Non Sq Epi: Negative / Bacteria: Negative        Culture - Urine (collected 28 Aug 2022 18:36)  Source: Clean Catch Clean Catch (Midstream)  Final Report (29 Aug 2022 23:01):    <10,000 CFU/mL Normal Urogenital Mely      RADIOLOGICAL FINDINGS REVIEW:    Head CT:  < from: CT Head No Cont (22 @ 16:56) >  No acute intracranial hemorrhage, mass-effect or midline shift.  Numerous calvarial lucent lesion, increased since prior exam (CT head   12/3/2004) of indeterminate etiology  < end of copied text >    C-Spine CT:  < from: CT Cervical Spine No Cont (22 @ 16:56) >   Examination is significantly limited due to motion artifact.  No definite evidence of acute cervical spine fracture or subluxation. If   there is clinical concern for cervical spine fracture, recommend repeat   CT.  < end of copied text >    Chest /abd/pelvis CT:   < from: CT Chest No Cont (22 @ 16:56) >  Acute fractures within the right and left aspect of the sacrum.  Acute right eighth and left ninth rib fractures. Cortical irregularity of   the left anterior fourth fifth and sixth ribs indeterminate age  Severe T10 and moderate to severe at T5 vertebral body compression   fracture of indeterminate age. Severe compression deformity of L2   vertebral body of indeterminate age.  < end of copied text >    Other:    ASSESSMENT/ PLAN:   77yM w/ PMHx of Parkinson's disease, Hx of back surgery (2019), H/O cardiac stent placement, seen as Trauma Consult s/p fall 2 weeks ago, -ht, -loc, -ac.  The patient is accompanied by his wife stating he had two falls 2 weeks ago, one was down several steps and the other he fell and struck his right knee. The patient has been participating in physical therapy for his on-going back issues and worsening Parkinson's disease, noting that his back pain has progressively worsened and has associated shooting pain down both legs. The patient came to ED today as his wife states its been very difficult for her to take care of him and he also started to develop urinary incontinence. The patient initially presented to ED south, Pan scan was performed demonstrating right 8th, left 9th rib fractures, acute fracture of right and left aspect of sacrum and several age indeterminate Thoracic compression fractures. The patient is completely non-tender on examination, is pulling 1500 on incentive. Neurosurgery evaluated the patient and recommends abdominal binder and non-emergent MRI.     Trauma assessment in ED: ABCs intact , GCS 15 , AAOx3.    - Conversation with Orthopedic surgery regarding sacral fx: not convinced that it's a true finding. 3D Reconstructions of pelvis CT. Orthopedic service to discuss 3D reconstruction with [facility] radiologist in the morning.   - NWB B/L LE until further imaging reviewed to determine type of sacral fracture  - Possible sacral fracture to be managed by orthopedic surgery  - All images/reports reviewed. No further traumatic work-up warranted. Tertiary Trauma Survey (TTS)    Date of TTS: 22 @ 00:35   Admit Date: 22  Trauma Activation: CODE / ALERT / CONSULT  Admit GCS:        DARIEL-     TAMIA-     M-     77 y M with a PMHx of Parkinson's disease, Hx of back surgery (2019), H/O cardiac stent placement , H/O esophogeal ulcers HTN, Osteoporosis. The patient came in for evaluation of multiple falls and back pain.  The patient is accompanied by his wife stating he had two falls 2 weeks ago, one was down several steps and the other he fell and struck his right knee. The patient had a dizzy and lightheaded sensation prior to one of the falls, but there was no LOC, urinary/fecal incontinence or post episode confusion. The second one was purely mechanical when he slipped going up a flight of stairs. The patient has been participating in physical therapy for his on-going back issues and worsening Parkinson's disease, noting that his back pain has progressively worsened and has associated shooting pain down both legs. The patient came to ED today as his wife states its been very difficult for her to take care of him and he also started to develop urinary incontinence. The urinary incontinence started a few days ago. There is no overwhelming urge to urinate and patient looses urine all of a sudden without realizing it. The patient initially presented to ED south, Pan scan was performed demonstrating right 8th, left 9th rib fractures, acute fracture of right and left aspect of sacrum and several age indeterminate Thoracic compression fractures.    The patient was evaluated by trauma and neurosurgery teams and will be admitted to medicine for W/U of recurrent falls.    (28 Aug 2022 22:18)    Patient seen and examined.     PHYSICAL EXAM:  General: NAD  HEENT: Normocephalic, atraumatic, EOMI, PEERLA. no scalp lacerations   Neck: Soft, midline trachea. no c-spine tenderness  Chest: No chest wall tenderness, no subcutaneous emphysema   Cardiac: S1, S2, RRR  Respiratory: Bilateral breath sounds, clear and equal bilaterally  Abdomen: Soft, non-distended, non-tender, no rebound, no guarding.  Groin: Normal appearing, pelvis stable   Ext:  Moving b/l upper and lower extremities. Palpable Radial b/l UE, b/l DP palpable in LE.   Back: No T/L/S spine tenderness, No palpable runoff/stepoff/deformity    Vital Signs Last 24 Hrs  T(C): 36.3 (29 Aug 2022 16:00), Max: 36.7 (29 Aug 2022 07:50)  T(F): 97.3 (29 Aug 2022 16:00), Max: 98.1 (29 Aug 2022 07:50)  HR: 89 (30 Aug 2022 00:09) (72 - 91)  BP: 130/67 (30 Aug 2022 00:09) (96/53 - 169/79)  BP(mean): --  RR: 18 (29 Aug 2022 23:39) (18 - 18)  SpO2: --        Labs:  CAPILLARY BLOOD GLUCOSE                              9.9    7.17  )-----------( 345      ( 29 Aug 2022 07:28 )             30.8             133<L>  |  102  |  19  ----------------------------<  111<H>  4.3   |  24  |  0.6<L>      Calcium, Total Serum: 9.0 mg/dL (22 @ 07:28)      LFTs:             6.3  | 0.3  | 28       ------------------[116     ( 29 Aug 2022 07:28 )  3.5  | x    | <5          Lipase:x      Amylase:x             Coags:     12.80  ----< 1.11    ( 28 Aug 2022 16:14 )     39.7                Urinalysis Basic - ( 28 Aug 2022 18:35 )    Color: Yellow / Appearance: Clear / S.020 / pH: x  Gluc: x / Ketone: Negative  / Bili: Negative / Urobili: 0.2 mg/dL   Blood: x / Protein: Trace mg/dL / Nitrite: Negative   Leuk Esterase: Negative / RBC: Negative / WBC 1-2 /HPF   Sq Epi: x / Non Sq Epi: Negative / Bacteria: Negative        Culture - Urine (collected 28 Aug 2022 18:36)  Source: Clean Catch Clean Catch (Midstream)  Final Report (29 Aug 2022 23:01):    <10,000 CFU/mL Normal Urogenital Mely      RADIOLOGICAL FINDINGS REVIEW:    Head CT:  < from: CT Head No Cont (22 @ 16:56) >  No acute intracranial hemorrhage, mass-effect or midline shift.  Numerous calvarial lucent lesion, increased since prior exam (CT head   12/3/2004) of indeterminate etiology  < end of copied text >    C-Spine CT:  < from: CT Cervical Spine No Cont (22 @ 16:56) >   Examination is significantly limited due to motion artifact.  No definite evidence of acute cervical spine fracture or subluxation. If   there is clinical concern for cervical spine fracture, recommend repeat   CT.  < end of copied text >    Chest /abd/pelvis CT:   < from: CT Chest No Cont (22 @ 16:56) >  Acute fractures within the right and left aspect of the sacrum.  Acute right eighth and left ninth rib fractures. Cortical irregularity of   the left anterior fourth fifth and sixth ribs indeterminate age  Severe T10 and moderate to severe at T5 vertebral body compression   fracture of indeterminate age. Severe compression deformity of L2   vertebral body of indeterminate age.  < end of copied text >    Other:    ASSESSMENT/ PLAN:   77yM w/ PMHx of Parkinson's disease, Hx of back surgery (2019), H/O cardiac stent placement, seen as Trauma Consult s/p fall 2 weeks ago, -ht, -loc, -ac.  The patient is accompanied by his wife stating he had two falls 2 weeks ago, one was down several steps and the other he fell and struck his right knee. The patient has been participating in physical therapy for his on-going back issues and worsening Parkinson's disease, noting that his back pain has progressively worsened and has associated shooting pain down both legs. The patient came to ED today as his wife states its been very difficult for her to take care of him and he also started to develop urinary incontinence. The patient initially presented to ED south, Pan scan was performed demonstrating right 8th, left 9th rib fractures, acute fracture of right and left aspect of sacrum and several age indeterminate Thoracic compression fractures. The patient is completely non-tender on examination, is pulling 1500 on incentive. Neurosurgery evaluated the patient and recommends abdominal binder and non-emergent MRI.     Trauma assessment in ED: ABCs intact , GCS 15 , AAOx3.    - Conversation with Orthopedic surgery regarding sacral fx: not convinced that it's a true finding. Negative findings for S1/S2 palsy. 3D Reconstructions of pelvis CT. Orthopedic service to discuss 3D reconstruction with [facility] radiologist in the morning.   - NWB B/L LE until further imaging reviewed to determine type of sacral fracture  - Possible sacral fracture to be managed by orthopedic surgery  - All images/reports reviewed. No further traumatic work-up warranted.

## 2022-08-30 NOTE — DIETITIAN NUTRITION RISK NOTIFICATION - TREATMENT: THE FOLLOWING DIET HAS BEEN RECOMMENDED
Diet, DASH/TLC:   Sodium & Cholesterol Restricted  Supplement Feeding Modality:  Oral  Ensure Enlive Cans or Servings Per Day:  3       Frequency:  Daily (08-30-22 @ 15:20) [Active]

## 2022-08-30 NOTE — CONSULT NOTE ADULT - CONSULT REASON
Back pain & urinary incontinence s/p L4-T11 fusion Hx burst fx
b/l sacral fractures
MRI of Spine : enhancement in dorsal paraspinal muscle concerning for myositis
S/P fall 2 weeks ago, -HT, -LOC, -AC

## 2022-08-30 NOTE — DIETITIAN INITIAL EVALUATION ADULT - FLUID ACCUMULATION
NFPE: observed slight muscle loss to calf region likely related to age  EDEMA: no edema noted per flowsheet

## 2022-08-30 NOTE — DIETITIAN INITIAL EVALUATION ADULT - OTHER INFO
Pt has poor PO intake; consuming <50% of meals provided in-house.     Weight hx: UBW unknown. Current dosing weight is 54.4 KG. No previous admission weights in EMR.

## 2022-08-30 NOTE — DIETITIAN INITIAL EVALUATION ADULT - ORAL INTAKE PTA/DIET HISTORY
Pt is unable to participate in full nutrition assessment; disoriented per flowsheet. Unable to reach emergency contact at this time to obtain nutrition hx. Will attempt again at follow up. Pt states he follows a kosher diet PTA; unable to verify due to pt's cognitive function.

## 2022-08-30 NOTE — CONSULT NOTE ADULT - SUBJECTIVE AND OBJECTIVE BOX
HPI:  77 y M with a PMHx of Parkinson's disease, Hx of back surgery (2019), H/O cardiac stent placement , H/O esophogeal ulcers HTN, Osteoporosis. The patient came in for evaluation of multiple falls and back pain.  The patient is accompanied by his wife stating he had two falls 2 weeks ago, one was down several steps and the other he fell and struck his right knee. The patient had a dizzy and lightheaded sensation prior to one of the falls, but there was no LOC, urinary/fecal incontinence or post episode confusion. The second one was purely mechanical when he slipped going up a flight of stairs. The patient has been participating in physical therapy for his on-going back issues and worsening Parkinson's disease, noting that his back pain has progressively worsened and has associated shooting pain down both legs. The patient came to ED today as his wife states its been very difficult for her to take care of him and he also started to develop urinary incontinence. The urinary incontinence started a few days ago. There is no overwhelming urge to urinate and patient looses urine all of a sudden without realizing it. The patient initially presented to ED south, Pan scan was performed demonstrating right 8th, left 9th rib fractures, acute fracture of right and left aspect of sacrum and several age indeterminate Thoracic compression fractures.  The patient was evaluated by trauma and neurosurgery teams and will be admitted to medicine for W/U of recurrent falls.    (28 Aug 2022 22:18)      PAST MEDICAL & SURGICAL HISTORY:  Parkinson  History of back surgery  H/O heart artery stent        Medications:  acetaminophen     Tablet .. 650 milliGRAM(s) Oral every 6 hours PRN  amLODIPine   Tablet 5 milliGRAM(s) Oral daily  aspirin  chewable 81 milliGRAM(s) Oral daily  atorvastatin 20 milliGRAM(s) Oral at bedtime  carbidopa/levodopa  25/250 1 Tablet(s) Oral four times a day  carbidopa/levodopa CR 25/100 1.5 Tablet(s) Oral <User Schedule>  enoxaparin Injectable 40 milliGRAM(s) SubCutaneous every 24 hours  ibuprofen  Tablet. 400 milliGRAM(s) Oral every 6 hours PRN  metoprolol succinate ER 50 milliGRAM(s) Oral daily  pantoprazole    Tablet 40 milliGRAM(s) Oral before breakfast  polyethylene glycol 3350 17 Gram(s) Oral two times a day  selegiline Oral Tab/Cap 5 milliGRAM(s) Oral <User Schedule>  senna 2 Tablet(s) Oral at bedtime      Vital Signs Last 24 Hrs  T(C): 36.3 (30 Aug 2022 15:42), Max: 36.3 (30 Aug 2022 15:42)  T(F): 97.4 (30 Aug 2022 15:42), Max: 97.4 (30 Aug 2022 15:42)  HR: 75 (30 Aug 2022 15:42) (72 - 89)  BP: 121/56 (30 Aug 2022 15:42) (121/56 - 169/79)  BP(mean): --  RR: 18 (30 Aug 2022 15:42) (18 - 18)  SpO2: --        Neurological Exam:   Mental status: Awake, alert and oriented x3.  Naming, repetition and comprehension intact.  Attention/concentration intact.  No dysarthria, no aphasia.    Cranial nerves: Pupils equally round and reactive to light, visual fields full, no nystagmus, extraocular muscles intact, V1 through V3 intact bilaterally and symmetric, face symmetric,   Motor:  MRC grading 5/5 b/l UE 4/5 b/l LE.   strength 5/5.  Normal tone and bulk.  no tremor, some cogwheeling RIght>left, some overflow dyskinesia noted  Sensation: Intact to light touch,  Coordination: No dysmetria on finger-to-nose   Reflexes: 2+ in bilateral UE/LE, downgoing toes bilaterally.      Labs:  CBC Full  -  ( 30 Aug 2022 06:48 )  WBC Count : 6.92 K/uL  RBC Count : 3.66 M/uL  Hemoglobin : 10.0 g/dL  Hematocrit : 30.7 %  Platelet Count - Automated : 315 K/uL  Mean Cell Volume : 83.9 fL  Mean Cell Hemoglobin : 27.3 pg  Mean Cell Hemoglobin Concentration : 32.6 g/dL        135  |  101  |  23<H>  ----------------------------<  111<H>  4.6   |  23  |  0.7    Ca    9.2      30 Aug 2022 06:48  Mg     2.2       TPro  6.4  /  Alb  3.5  /  TBili  0.3  /  DBili  x   /  AST  17  /  ALT  <5  /  AlkPhos  122<H>    LIVER FUNCTIONS - ( 30 Aug 2022 06:48 )  Alb: 3.5 g/dL / Pro: 6.4 g/dL / ALK PHOS: 122 U/L / ALT: <5 U/L / AST: 17 U/L / GGT: x             Urinalysis Basic - ( 28 Aug 2022 18:35 )  Color: Yellow / Appearance: Clear / S.020 / pH: x  Gluc: x / Ketone: Negative  / Bili: Negative / Urobili: 0.2 mg/dL   Blood: x / Protein: Trace mg/dL / Nitrite: Negative   Leuk Esterase: Negative / RBC: Negative / WBC 1-2 /HPF   Sq Epi: x / Non Sq Epi: Negative / Bacteria: Negative      < from: MR Lumbar Spine w/wo IV Cont (22 @ 21:25) >  1.  Study limited by artifact.  2.  Postoperative changes.  3.  Multiple compression fractures of varying ages likely secondary to   osteopenia, consider the possibility of multiple myeloma.  4.  Acute bilateral sacral fractures.  5.  Rounded focus of abnormal signal intensity and heterogeneous   enhancement anterior to the left sacroiliac joint and visualized left   iliac bone, involving the left iliacus muscle, incompletely imaged,   consider the possibility of hemorrhage and/or infection, MRI of the   pelvis without and with contrast is recommended for further evaluation.  6.  Abnormal signal and heterogeneous enhancement within the dorsal   paraspinal muscles consistent with myositis (infectious or inflammatory).  7.  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  8.  L1-L2; annular disc bulge and retropulsion of bone into the spinal   canal with compression of the thecal sac and bilateral foraminal   narrowing.  9.  L3-L4; annular disc bulge with compression of the thecal sac,   degenerative changes, and bilateral foraminal narrowing.  10.  L4-L5; retropulsion ofbone into the spinal canal, central spinal   stenosis, bilateral foraminal narrowing, and left lateral recess   narrowing.  11.  L5-S1; central spinal stenosis, bilateral foraminal narrowing, and   bilateral lateral recess narrowing.      < from: MR Thoracic Spine w/wo IV Cont (22 @ 21:22) >  Study limited by motion artifact. Evaluation of abnormal signal in   the thoracic spinal cord is suboptimal.  2.  No MRI evidence ofthoracic spinal cord compression.  3.  Postoperative changes.  4.  Multiple compression fractures of varying ages, may be secondary to   osteopenia, consider the possibility of multiple myeloma.  5.  Severe acute compression fracture of T10 with retropulsion of bone   into the spinal canal.  6.  T7-T8 and T8-T9: Degenerative changes.

## 2022-08-31 LAB
ALBUMIN SERPL ELPH-MCNC: 3.5 G/DL — SIGNIFICANT CHANGE UP (ref 3.5–5.2)
ALP SERPL-CCNC: 134 U/L — HIGH (ref 30–115)
ALT FLD-CCNC: <5 U/L — SIGNIFICANT CHANGE UP (ref 0–41)
ANION GAP SERPL CALC-SCNC: 17 MMOL/L — HIGH (ref 7–14)
AST SERPL-CCNC: 19 U/L — SIGNIFICANT CHANGE UP (ref 0–41)
BASOPHILS # BLD AUTO: 0.07 K/UL — SIGNIFICANT CHANGE UP (ref 0–0.2)
BASOPHILS NFR BLD AUTO: 1.3 % — HIGH (ref 0–1)
BILIRUB SERPL-MCNC: 0.3 MG/DL — SIGNIFICANT CHANGE UP (ref 0.2–1.2)
BUN SERPL-MCNC: 17 MG/DL — SIGNIFICANT CHANGE UP (ref 10–20)
CALCIUM SERPL-MCNC: 9.5 MG/DL — SIGNIFICANT CHANGE UP (ref 8.5–10.1)
CHLORIDE SERPL-SCNC: 102 MMOL/L — SIGNIFICANT CHANGE UP (ref 98–110)
CK SERPL-CCNC: 56 U/L — SIGNIFICANT CHANGE UP (ref 0–225)
CO2 SERPL-SCNC: 18 MMOL/L — SIGNIFICANT CHANGE UP (ref 17–32)
CREAT SERPL-MCNC: 0.7 MG/DL — SIGNIFICANT CHANGE UP (ref 0.7–1.5)
EGFR: 94 ML/MIN/1.73M2 — SIGNIFICANT CHANGE UP
EOSINOPHIL # BLD AUTO: 0 K/UL — SIGNIFICANT CHANGE UP (ref 0–0.7)
EOSINOPHIL NFR BLD AUTO: 0 % — SIGNIFICANT CHANGE UP (ref 0–8)
FERRITIN SERPL-MCNC: 84 NG/ML — SIGNIFICANT CHANGE UP (ref 30–400)
GLUCOSE SERPL-MCNC: 116 MG/DL — HIGH (ref 70–99)
HCT VFR BLD CALC: 35.2 % — LOW (ref 42–52)
HGB BLD-MCNC: 11.2 G/DL — LOW (ref 14–18)
IMM GRANULOCYTES NFR BLD AUTO: 2.2 % — HIGH (ref 0.1–0.3)
LYMPHOCYTES # BLD AUTO: 1.48 K/UL — SIGNIFICANT CHANGE UP (ref 1.2–3.4)
LYMPHOCYTES # BLD AUTO: 27.5 % — SIGNIFICANT CHANGE UP (ref 20.5–51.1)
MAGNESIUM SERPL-MCNC: 2.1 MG/DL — SIGNIFICANT CHANGE UP (ref 1.8–2.4)
MCHC RBC-ENTMCNC: 26.9 PG — LOW (ref 27–31)
MCHC RBC-ENTMCNC: 31.8 G/DL — LOW (ref 32–37)
MCV RBC AUTO: 84.6 FL — SIGNIFICANT CHANGE UP (ref 80–94)
MONOCYTES # BLD AUTO: 0.63 K/UL — HIGH (ref 0.1–0.6)
MONOCYTES NFR BLD AUTO: 11.7 % — HIGH (ref 1.7–9.3)
NEUTROPHILS # BLD AUTO: 3.08 K/UL — SIGNIFICANT CHANGE UP (ref 1.4–6.5)
NEUTROPHILS NFR BLD AUTO: 57.3 % — SIGNIFICANT CHANGE UP (ref 42.2–75.2)
NRBC # BLD: 0 /100 WBCS — SIGNIFICANT CHANGE UP (ref 0–0)
OSMOLALITY UR: 522 MOS/KG — SIGNIFICANT CHANGE UP (ref 50–1200)
PLATELET # BLD AUTO: 377 K/UL — SIGNIFICANT CHANGE UP (ref 130–400)
POTASSIUM SERPL-MCNC: 4.6 MMOL/L — SIGNIFICANT CHANGE UP (ref 3.5–5)
POTASSIUM SERPL-SCNC: 4.6 MMOL/L — SIGNIFICANT CHANGE UP (ref 3.5–5)
PROT SERPL-MCNC: 6.8 G/DL — SIGNIFICANT CHANGE UP (ref 6–8.3)
PROT SERPL-MCNC: 6.8 G/DL — SIGNIFICANT CHANGE UP (ref 6–8.3)
PROT SERPL-MCNC: 6.9 G/DL — SIGNIFICANT CHANGE UP (ref 6–8)
RBC # BLD: 4.16 M/UL — LOW (ref 4.7–6.1)
RBC # FLD: 13.3 % — SIGNIFICANT CHANGE UP (ref 11.5–14.5)
SODIUM SERPL-SCNC: 137 MMOL/L — SIGNIFICANT CHANGE UP (ref 135–146)
SODIUM UR-SCNC: 70 MMOL/L — SIGNIFICANT CHANGE UP
WBC # BLD: 5.38 K/UL — SIGNIFICANT CHANGE UP (ref 4.8–10.8)
WBC # FLD AUTO: 5.38 K/UL — SIGNIFICANT CHANGE UP (ref 4.8–10.8)

## 2022-08-31 PROCEDURE — 99233 SBSQ HOSP IP/OBS HIGH 50: CPT

## 2022-08-31 RX ORDER — CARBIDOPA AND LEVODOPA 25; 100 MG/1; MG/1
1 TABLET ORAL
Refills: 0 | Status: DISCONTINUED | OUTPATIENT
Start: 2022-08-31 | End: 2022-09-04

## 2022-08-31 RX ADMIN — SENNA PLUS 2 TABLET(S): 8.6 TABLET ORAL at 22:38

## 2022-08-31 RX ADMIN — ENOXAPARIN SODIUM 40 MILLIGRAM(S): 100 INJECTION SUBCUTANEOUS at 22:38

## 2022-08-31 RX ADMIN — CARBIDOPA AND LEVODOPA 1 TABLET(S): 25; 100 TABLET ORAL at 12:37

## 2022-08-31 RX ADMIN — CARBIDOPA AND LEVODOPA 1 TABLET(S): 25; 100 TABLET ORAL at 05:50

## 2022-08-31 RX ADMIN — POLYETHYLENE GLYCOL 3350 17 GRAM(S): 17 POWDER, FOR SOLUTION ORAL at 06:16

## 2022-08-31 RX ADMIN — SELEGILINE HYDROCHLORIDE 5 MILLIGRAM(S): 1.25 TABLET, ORALLY DISINTEGRATING ORAL at 05:50

## 2022-08-31 RX ADMIN — CARBIDOPA AND LEVODOPA 1.5 TABLET(S): 25; 100 TABLET ORAL at 17:45

## 2022-08-31 RX ADMIN — AMLODIPINE BESYLATE 5 MILLIGRAM(S): 2.5 TABLET ORAL at 05:50

## 2022-08-31 RX ADMIN — PANTOPRAZOLE SODIUM 40 MILLIGRAM(S): 20 TABLET, DELAYED RELEASE ORAL at 06:34

## 2022-08-31 RX ADMIN — Medication 50 MILLIGRAM(S): at 05:50

## 2022-08-31 RX ADMIN — ATORVASTATIN CALCIUM 20 MILLIGRAM(S): 80 TABLET, FILM COATED ORAL at 22:38

## 2022-08-31 RX ADMIN — Medication 400 MILLIGRAM(S): at 13:21

## 2022-08-31 RX ADMIN — POLYETHYLENE GLYCOL 3350 17 GRAM(S): 17 POWDER, FOR SOLUTION ORAL at 17:46

## 2022-08-31 RX ADMIN — CARBIDOPA AND LEVODOPA 1.5 TABLET(S): 25; 100 TABLET ORAL at 05:50

## 2022-08-31 RX ADMIN — CARBIDOPA AND LEVODOPA 1 TABLET(S): 25; 100 TABLET ORAL at 17:45

## 2022-08-31 RX ADMIN — Medication 650 MILLIGRAM(S): at 13:18

## 2022-08-31 NOTE — PROGRESS NOTE ADULT - SUBJECTIVE AND OBJECTIVE BOX
HPI:  77 y M with a PMHx of Parkinson's disease, Hx of back surgery (2019), H/O cardiac stent placement 2005, H/O esophogeal ulcers HTN, Osteoporosis. The patient came in for evaluation of multiple falls and back pain.  The patient is accompanied by his wife stating he had two falls 2 weeks ago, one was down several steps and the other he fell and struck his right knee. The patient had a dizzy and lightheaded sensation prior to one of the falls, but there was no LOC, urinary/fecal incontinence or post episode confusion. The second one was purely mechanical when he slipped going up a flight of stairs. The patient has been participating in physical therapy for his on-going back issues and worsening Parkinson's disease, noting that his back pain has progressively worsened and has associated shooting pain down both legs. The patient came to ED today as his wife states its been very difficult for her to take care of him and he also started to develop urinary incontinence. The urinary incontinence started a few days ago. There is no overwhelming urge to urinate and patient looses urine all of a sudden without realizing it. [place], Pan scan was performed demonstrating right 8th, left 9th rib fractures, acute fracture of right and left aspect of sacrum and several age indeterminate Thoracic compression fractures.    The patient was evaluated by trauma and neurosurgery teams and will be admitted to medicine for W/U of recurrent falls.    (28 Aug 2022 22:18)    Currently admitted to medicine with the primary diagnosis of Fall       Today is hospital day 3d.     INTERVAL HPI / OVERNIGHT EVENTS:  Patient was examined and seen at bedside. This morning he is resting comfortably in bed and reports no new issues or overnight events.     ROS: Otherwise unremarkable     PAST MEDICAL & SURGICAL HISTORY  Parkinsons    History of back surgery    H/O heart artery stent      ALLERGIES  No Known Allergies    MEDICATIONS  STANDING MEDICATIONS  amLODIPine   Tablet 5 milliGRAM(s) Oral daily  aspirin  chewable 81 milliGRAM(s) Oral daily  atorvastatin 20 milliGRAM(s) Oral at bedtime  carbidopa/levodopa  25/250 1 Tablet(s) Oral four times a day  carbidopa/levodopa CR 25/100 1.5 Tablet(s) Oral <User Schedule>  enoxaparin Injectable 40 milliGRAM(s) SubCutaneous every 24 hours  metoprolol succinate ER 50 milliGRAM(s) Oral daily  pantoprazole    Tablet 40 milliGRAM(s) Oral before breakfast  polyethylene glycol 3350 17 Gram(s) Oral two times a day  selegiline Oral Tab/Cap 5 milliGRAM(s) Oral <User Schedule>  senna 2 Tablet(s) Oral at bedtime    PRN MEDICATIONS  acetaminophen     Tablet .. 650 milliGRAM(s) Oral every 6 hours PRN  ibuprofen  Tablet. 400 milliGRAM(s) Oral every 6 hours PRN    VITALS:  T(F): 96.9  HR: 79  BP: 153/72  RR: 18  SpO2: --    PHYSICAL EXAM  GEN: NAD, Resting comfortably in bed  PULM: Clear to auscultation bilaterally, No wheezes  CVS: Regular rate and rhythm, S1-S2, no murmurs  ABD: Soft, non-tender, non-distended, no guarding  EXT: No edema  NEURO: A&Ox3, no focal deficits    LABS                        11.2   5.38  )-----------( 377      ( 31 Aug 2022 06:38 )             35.2     08-30    135  |  101  |  23<H>  ----------------------------<  111<H>  4.6   |  23  |  0.7    Ca    9.2      30 Aug 2022 06:48  Mg     2.2     08-30    TPro  6.8  /  Alb  x   /  TBili  x   /  DBili  x   /  AST  x   /  ALT  x   /  AlkPhos  x   08-30          Sedimentation Rate, Erythrocyte: 65 mm/Hr *H* (08-30-22 @ 17:43)      Culture - Urine (collected 28 Aug 2022 18:36)  Source: Clean Catch Clean Catch (Midstream)  Final Report (29 Aug 2022 23:01):    <10,000 CFU/mL Normal Urogenital Mely

## 2022-08-31 NOTE — CHART NOTE - NSCHARTNOTEFT_GEN_A_CORE
ORTHO UPDATE NOTE:    Per discussion with attending Dr. Tracey:    - Patient may be WBAT BLE  - Please obtain lateral XR of the sacrum when patient it ambulatory with PT  - No surgical intervention  - Please call ortho with any questions [phone]

## 2022-08-31 NOTE — PROGRESS NOTE ADULT - ATTENDING COMMENTS
Patient is a 76 y/o  Male  w/ PMHx of Parkinson's, back surgery (2019), cardiac stent placement (2005) presents for evaluation of multiple falls w/ back pain.    # Back pain S/P Fall  # Acute bilateral sacral fractures   - CT showed Severe T10 and moderate to severe at T5 vertebral body compression fracture of indeterminate age. Severe compression deformity of L2 vertebral body of indeterminate age.  - Neurosurgery following- rec.  No acute neurosurgical intervention   - MRI T/L spine- multiple compression fx concern for MM, acute bilateral sacral fractures, concern for myositis     - UPEP/SPEP ordered, ortho consulted, neuro consulted, Neurosurgery spoke with pt's son Dr. Gomez - a orthopedic doc - now no plans for neurosurgery at [facility]  - Abdominal binder / TLSO   - Pain control   - CT head does not show acute bleed.   - Fall precautions / - PT/OT with brace as tolerated     # Urinary incontinency - improving after large bowel movement   - Likely Overflow based on hx  - Kidney bladder US with PVR      # Parkinson's disease  - C/W home parkinsons medication    # HTN  - C/W amlodipine 5 mg PO QD      # CAD S/P PCI  - C/W ASA  - Lipitor 20 mg PO QD  - Metoprolol succinate 50 mg PO QD    # Anemia with iron def- started on daily iron tx.      # Elevated Alk Phos  - Likely 2/2 to bone etiology  - GGT wnl    # Constipation  - Miralax BID,  Senna qhs     DVT Prophylaxis: enoxaparin Injectable    -Code Status: Full    #Progress Note Handoff: PT tx. with brace, f/up MM work up. pain management  Family discussion: yes, medical team Disposition: STR once medically stable    Total time spent to complete patient's bedside assessment, review medical chart, discuss medical plan of care with covering medical team was more than 35 minutes

## 2022-08-31 NOTE — CHART NOTE - NSCHARTNOTEFT_GEN_A_CORE
NEUROSURGERY ATTENDING NOTE  Mr Gomez was seen and examined this AM in anticipation of previously planned L5-ilium posterior spinal instrumentation and fusion for bilateral sacral insufficiency fractures with kyphosis. After extensive discussion with patient's son (Dr Renee, S) we will hold off on surgery and plan for non-operative management via pain control and bedrest. He will need TLSO brace at all times when out of bed while fracture heals with attention to preventing additional falls/mitigation of fall risk. Repeat imaging in 6 months (CT scan) or sooner if any change in mental status. Will need attention to DVT PPx as well given likely immobility. May follow-up in my clinic 2 weeks after discharge from hospital/rehab.

## 2022-09-01 LAB
ALBUMIN SERPL ELPH-MCNC: 3.8 G/DL — SIGNIFICANT CHANGE UP (ref 3.5–5.2)
ALP SERPL-CCNC: 141 U/L — HIGH (ref 30–115)
ALT FLD-CCNC: <5 U/L — SIGNIFICANT CHANGE UP (ref 0–41)
ANION GAP SERPL CALC-SCNC: 11 MMOL/L — SIGNIFICANT CHANGE UP (ref 7–14)
AST SERPL-CCNC: 14 U/L — SIGNIFICANT CHANGE UP (ref 0–41)
BASOPHILS # BLD AUTO: 0.07 K/UL — SIGNIFICANT CHANGE UP (ref 0–0.2)
BASOPHILS NFR BLD AUTO: 0.9 % — SIGNIFICANT CHANGE UP (ref 0–1)
BILIRUB SERPL-MCNC: 0.2 MG/DL — SIGNIFICANT CHANGE UP (ref 0.2–1.2)
BUN SERPL-MCNC: 16 MG/DL — SIGNIFICANT CHANGE UP (ref 10–20)
CALCIUM SERPL-MCNC: 9.7 MG/DL — SIGNIFICANT CHANGE UP (ref 8.5–10.1)
CHLORIDE SERPL-SCNC: 102 MMOL/L — SIGNIFICANT CHANGE UP (ref 98–110)
CO2 SERPL-SCNC: 23 MMOL/L — SIGNIFICANT CHANGE UP (ref 17–32)
CREAT SERPL-MCNC: 0.7 MG/DL — SIGNIFICANT CHANGE UP (ref 0.7–1.5)
CREATININE, URINE RESULT: 73 MG/DL — SIGNIFICANT CHANGE UP
EGFR: 94 ML/MIN/1.73M2 — SIGNIFICANT CHANGE UP
EOSINOPHIL # BLD AUTO: 0.07 K/UL — SIGNIFICANT CHANGE UP (ref 0–0.7)
EOSINOPHIL NFR BLD AUTO: 0.9 % — SIGNIFICANT CHANGE UP (ref 0–8)
GLUCOSE SERPL-MCNC: 104 MG/DL — HIGH (ref 70–99)
HCT VFR BLD CALC: 35.2 % — LOW (ref 42–52)
HGB BLD-MCNC: 11.1 G/DL — LOW (ref 14–18)
IMM GRANULOCYTES NFR BLD AUTO: 2.2 % — HIGH (ref 0.1–0.3)
KAPPA LC SER QL IFE: 4.08 MG/DL — HIGH (ref 0.33–1.94)
KAPPA/LAMBDA FREE LIGHT CHAIN RATIO, SERUM: 1.5 RATIO — SIGNIFICANT CHANGE UP (ref 0.26–1.65)
LAMBDA LC SER QL IFE: 2.72 MG/DL — HIGH (ref 0.57–2.63)
LYMPHOCYTES # BLD AUTO: 1.84 K/UL — SIGNIFICANT CHANGE UP (ref 1.2–3.4)
LYMPHOCYTES # BLD AUTO: 23 % — SIGNIFICANT CHANGE UP (ref 20.5–51.1)
MAGNESIUM SERPL-MCNC: 2.2 MG/DL — SIGNIFICANT CHANGE UP (ref 1.8–2.4)
MCHC RBC-ENTMCNC: 26.3 PG — LOW (ref 27–31)
MCHC RBC-ENTMCNC: 31.5 G/DL — LOW (ref 32–37)
MCV RBC AUTO: 83.4 FL — SIGNIFICANT CHANGE UP (ref 80–94)
MONOCYTES # BLD AUTO: 0.94 K/UL — HIGH (ref 0.1–0.6)
MONOCYTES NFR BLD AUTO: 11.7 % — HIGH (ref 1.7–9.3)
NEUTROPHILS # BLD AUTO: 4.91 K/UL — SIGNIFICANT CHANGE UP (ref 1.4–6.5)
NEUTROPHILS NFR BLD AUTO: 61.3 % — SIGNIFICANT CHANGE UP (ref 42.2–75.2)
NRBC # BLD: 0 /100 WBCS — SIGNIFICANT CHANGE UP (ref 0–0)
PLATELET # BLD AUTO: 468 K/UL — HIGH (ref 130–400)
POTASSIUM SERPL-MCNC: 4.8 MMOL/L — SIGNIFICANT CHANGE UP (ref 3.5–5)
POTASSIUM SERPL-SCNC: 4.8 MMOL/L — SIGNIFICANT CHANGE UP (ref 3.5–5)
PROT ?TM UR-MCNC: 14 MG/DL — HIGH (ref 0–12)
PROT ?TM UR-MCNC: 14 MG/DL — HIGH (ref 0–12)
PROT SERPL-MCNC: 7 G/DL — SIGNIFICANT CHANGE UP (ref 6–8)
RBC # BLD: 4.22 M/UL — LOW (ref 4.7–6.1)
RBC # FLD: 13.3 % — SIGNIFICANT CHANGE UP (ref 11.5–14.5)
SODIUM SERPL-SCNC: 136 MMOL/L — SIGNIFICANT CHANGE UP (ref 135–146)
WBC # BLD: 8.01 K/UL — SIGNIFICANT CHANGE UP (ref 4.8–10.8)
WBC # FLD AUTO: 8.01 K/UL — SIGNIFICANT CHANGE UP (ref 4.8–10.8)

## 2022-09-01 PROCEDURE — 99233 SBSQ HOSP IP/OBS HIGH 50: CPT

## 2022-09-01 RX ADMIN — Medication 50 MILLIGRAM(S): at 06:16

## 2022-09-01 RX ADMIN — CARBIDOPA AND LEVODOPA 1 TABLET(S): 25; 100 TABLET ORAL at 00:30

## 2022-09-01 RX ADMIN — CARBIDOPA AND LEVODOPA 1 TABLET(S): 25; 100 TABLET ORAL at 23:24

## 2022-09-01 RX ADMIN — CARBIDOPA AND LEVODOPA 1 TABLET(S): 25; 100 TABLET ORAL at 06:16

## 2022-09-01 RX ADMIN — POLYETHYLENE GLYCOL 3350 17 GRAM(S): 17 POWDER, FOR SOLUTION ORAL at 06:14

## 2022-09-01 RX ADMIN — AMLODIPINE BESYLATE 5 MILLIGRAM(S): 2.5 TABLET ORAL at 06:16

## 2022-09-01 RX ADMIN — CARBIDOPA AND LEVODOPA 1.5 TABLET(S): 25; 100 TABLET ORAL at 12:51

## 2022-09-01 RX ADMIN — CARBIDOPA AND LEVODOPA 1.5 TABLET(S): 25; 100 TABLET ORAL at 06:15

## 2022-09-01 RX ADMIN — CARBIDOPA AND LEVODOPA 1 TABLET(S): 25; 100 TABLET ORAL at 11:38

## 2022-09-01 RX ADMIN — ATORVASTATIN CALCIUM 20 MILLIGRAM(S): 80 TABLET, FILM COATED ORAL at 21:06

## 2022-09-01 RX ADMIN — PANTOPRAZOLE SODIUM 40 MILLIGRAM(S): 20 TABLET, DELAYED RELEASE ORAL at 06:16

## 2022-09-01 RX ADMIN — Medication 650 MILLIGRAM(S): at 12:51

## 2022-09-01 RX ADMIN — SELEGILINE HYDROCHLORIDE 5 MILLIGRAM(S): 1.25 TABLET, ORALLY DISINTEGRATING ORAL at 06:15

## 2022-09-01 RX ADMIN — SENNA PLUS 2 TABLET(S): 8.6 TABLET ORAL at 21:06

## 2022-09-01 RX ADMIN — ENOXAPARIN SODIUM 40 MILLIGRAM(S): 100 INJECTION SUBCUTANEOUS at 21:06

## 2022-09-01 RX ADMIN — CARBIDOPA AND LEVODOPA 1 TABLET(S): 25; 100 TABLET ORAL at 17:29

## 2022-09-01 RX ADMIN — CARBIDOPA AND LEVODOPA 1.5 TABLET(S): 25; 100 TABLET ORAL at 21:06

## 2022-09-01 RX ADMIN — Medication 400 MILLIGRAM(S): at 12:50

## 2022-09-01 NOTE — PROGRESS NOTE ADULT - SUBJECTIVE AND OBJECTIVE BOX
HPI:  77 y M with a PMHx of Parkinson's disease, Hx of back surgery (2019), H/O cardiac stent placement 2005, H/O esophogeal ulcers HTN, Osteoporosis. The patient came in for evaluation of multiple falls and back pain.  The patient is accompanied by his wife stating he had two falls 2 weeks ago, one was down several steps and the other he fell and struck his right knee. The patient had a dizzy and lightheaded sensation prior to one of the falls, but there was no LOC, urinary/fecal incontinence or post episode confusion. The second one was purely mechanical when he slipped going up a flight of stairs. The patient has been participating in physical therapy for his on-going back issues and worsening Parkinson's disease, noting that his back pain has progressively worsened and has associated shooting pain down both legs. The patient came to ED today as his wife states its been very difficult for her to take care of him and he also started to develop urinary incontinence. The urinary incontinence started a few days ago. There is no overwhelming urge to urinate and patient looses urine all of a sudden without realizing it. The patient [place] ED [place], Pan scan was performed demonstrating right 8th, left 9th rib fractures, acute fracture of right and left aspect of sacrum and several age indeterminate Thoracic compression fractures.    The patient was evaluated by trauma and neurosurgery teams and will be admitted to medicine for W/U of recurrent falls.    (28 Aug 2022 22:18)    Currently admitted to medicine with the primary diagnosis of Fall       Today is hospital day 4d.     INTERVAL HPI / OVERNIGHT EVENTS:  Patient was examined and seen at bedside. This morning he is resting comfortably in bed and reports no new issues or overnight events.     ROS: Otherwise unremarkable     PAST MEDICAL & SURGICAL HISTORY  Parkinsons    History of back surgery    H/O heart artery stent      ALLERGIES  No Known Allergies    MEDICATIONS  STANDING MEDICATIONS  amLODIPine   Tablet 5 milliGRAM(s) Oral daily  aspirin  chewable 81 milliGRAM(s) Oral daily  atorvastatin 20 milliGRAM(s) Oral at bedtime  carbidopa/levodopa  25/250 1 Tablet(s) Oral four times a day  carbidopa/levodopa CR 25/100 1.5 Tablet(s) Oral <User Schedule>  enoxaparin Injectable 40 milliGRAM(s) SubCutaneous every 24 hours  metoprolol succinate ER 50 milliGRAM(s) Oral daily  pantoprazole    Tablet 40 milliGRAM(s) Oral before breakfast  polyethylene glycol 3350 17 Gram(s) Oral two times a day  selegiline Oral Tab/Cap 5 milliGRAM(s) Oral <User Schedule>  senna 2 Tablet(s) Oral at bedtime    PRN MEDICATIONS  acetaminophen     Tablet .. 650 milliGRAM(s) Oral every 6 hours PRN  ibuprofen  Tablet. 400 milliGRAM(s) Oral every 6 hours PRN  LORazepam   Injectable 1 milliGRAM(s) IV Push once PRN    VITALS:  T(F): 97.2  HR: 71  BP: 184/86  RR: 18  SpO2: 97%    PHYSICAL EXAM  GEN: NAD, Resting comfortably in bed  PULM: Clear to auscultation bilaterally, No wheezes  CVS: Regular rate and rhythm, S1-S2, no murmurs  ABD: Soft, non-tender, non-distended, no guarding  EXT: No edema  NEURO: A&Ox3, no focal deficits    LABS                        11.1   8.01  )-----------( 468      ( 01 Sep 2022 06:43 )             35.2     09-01    136  |  102  |  16  ----------------------------<  104<H>  4.8   |  23  |  0.7    Ca    9.7      01 Sep 2022 06:43  Mg     2.2     09-01    TPro  7.0  /  Alb  3.8  /  TBili  0.2  /  DBili  x   /  AST  14  /  ALT  <5  /  AlkPhos  141<H>  09-01              CARDIAC MARKERS ( 31 Aug 2022 10:40 )  x     / x     / 56 U/L / x     / x

## 2022-09-01 NOTE — PROGRESS NOTE ADULT - SUBJECTIVE AND OBJECTIVE BOX
KURTLALO  [facility] F4-4[facility] 027 C ([facility])        Patient was evaluated and examined  by bedside, c/o buttocks pain, tolerating diet well      REVIEW OF SYSTEMS:  please see pertinent positives mentioned above, all other 12 ROS negative      T(C): , Max: 36.2 (09-01-22 @ 06:05)  HR: 71 (09-01-22 @ 06:05)  BP: 184/86 (09-01-22 @ 06:05)  RR: 18 (09-01-22 @ 06:05)  SpO2: 97% (09-01-22 @ 06:05)  CAPILLARY BLOOD GLUCOSE          PHYSICAL EXAM:  General: NAD, AAOX3, patient is laying comfortably in bed, cachectic   HEENT: AT, NC, Supple, NO JVD, NO CB  Lungs: CTA B/L, no wheezing, no rhonchi  CVS: normal S1, S2, RRR, NO M/G/R  Abdomen: soft, bowel sounds present, non-tender, non-distended  Extremities: no edema, no clubbing, no cyanosis, positive peripheral pulses b/l  Neuro: no acute focal neurological deficits, generalized body weakness, lower back/hips pain during mobility  Skin: no rash, no ecchymosis      LAB  CBC  Date: 09-01-22 @ 06:43  Mean cell Prpkrplnyr67.3  Mean cell Hemoglobin Conc31.5  Mean cell Volum 83.4  Platelet count-Automate 468  RBC Count 4.22  Red Cell Distrib Width13.3  WBC Count8.01  % Albumin, Urine--  Hematocrit 35.2  Hemoglobin 11.1  CBC  Date: 08-31-22 @ 06:38  Mean cell Jhjggynkil65.9  Mean cell Hemoglobin Conc31.8  Mean cell Volum 84.6  Platelet count-Automate 377  RBC Count 4.16  Red Cell Distrib Width13.3  WBC Count5.38  % Albumin, Urine--  Hematocrit 35.2  Hemoglobin 11.2  CBC  Date: 08-30-22 @ 06:48  Mean cell Effuwbjxqr83.3  Mean cell Hemoglobin Conc32.6  Mean cell Volum 83.9  Platelet count-Automate 315  RBC Count 3.66  Red Cell Distrib Width13.6  WBC Count6.92  % Albumin, Urine--  Hematocrit 30.7  Hemoglobin 10.0      BMP  09-01-22 @ 06:43  Blood Gas Arterial-Calcium,Ionized--  Blood Urea Nitrogen, Serum 16 mg/dL [10 - 20]  Carbon Dioxide, Serum23 mmol/L [17 - 32]  Chloride, Djcwf605 mmol/L [98 - 110]  Creatinie, Serum0.7 mg/dL [0.7 - 1.5]  Glucose, Iseiy200 mg/dL<H> [70 - 99]  Potassium, Serum4.8 mmol/L [3.5 - 5.0]  Sodium, Serum 136 mmol/L [135 - 146]  BMP  08-31-22 @ 06:38  Blood Gas Arterial-Calcium,Ionized--  Blood Urea Nitrogen, Serum 17 mg/dL [10 - 20]  Carbon Dioxide, Serum18 mmol/L [17 - 32]  Chloride, Ckuls796 mmol/L [98 - 110]  Creatinie, Serum0.7 mg/dL [0.7 - 1.5]  Glucose, Rdksj468 mg/dL<H> [70 - 99]  Potassium, Serum4.6 mmol/L [3.5 - 5.0] [Slighty Hemolyzed use with Caution]  Sodium, Serum 137 mmol/L [135 - 146]        Microbiology:    Culture - Urine (collected 08-28-22 @ 18:36)  Source: Clean Catch Clean Catch (Midstream)  Final Report (08-29-22 @ 23:01):    <10,000 CFU/mL Normal Urogenital Mely      Medications:  acetaminophen     Tablet .. 650 milliGRAM(s) Oral every 6 hours PRN  amLODIPine   Tablet 5 milliGRAM(s) Oral daily  aspirin  chewable 81 milliGRAM(s) Oral daily  atorvastatin 20 milliGRAM(s) Oral at bedtime  carbidopa/levodopa  25/250 1 Tablet(s) Oral four times a day  carbidopa/levodopa CR 25/100 1.5 Tablet(s) Oral <User Schedule>  enoxaparin Injectable 40 milliGRAM(s) SubCutaneous every 24 hours  ibuprofen  Tablet. 400 milliGRAM(s) Oral every 6 hours PRN  LORazepam   Injectable 1 milliGRAM(s) IV Push once PRN  metoprolol succinate ER 50 milliGRAM(s) Oral daily  pantoprazole    Tablet 40 milliGRAM(s) Oral before breakfast  polyethylene glycol 3350 17 Gram(s) Oral two times a day  selegiline Oral Tab/Cap 5 milliGRAM(s) Oral <User Schedule>  senna 2 Tablet(s) Oral at bedtime        Assessment and Plan:  Patient is a 78 y/o  Male  w/ PMHx of Parkinson's, back surgery (2019), cardiac stent placement (2005) presents for evaluation of multiple falls w/ back pain.    # Back pain S/P Fall  # Acute bilateral sacral/acute and chronic thoracic/lumbar compression fractures   - CT showed Severe T10 and moderate to severe at T5 vertebral body compression fracture of indeterminate age. Severe compression deformity of L2 vertebral body of indeterminate age.  - Neurosurgery following and Orthopedic Sx. following - rec.  No acute  intervention at present time  - MRI T/L spine- multiple compression fx concern for MM, acute bilateral sacral fractures, concern for myositis     - UPEP/SPEP ordered,  Neurosurgery spoke with pt's son Dr. Gomez - a orthopedic doc - now no plans for neurosurgery at [facility]  - Abdominal binder / TLSO   - Pain control   - CT head does not show acute bleed.   - Fall precautions / - PT/OT with brace as tolerated   - f/up MR of Pelvis    # Urinary incontinency - improving after large bowel movement   - Likely Overflow based on hx  - Kidney bladder US with PVR      # Parkinson's disease  - C/W home parkinsons medication    # HTN  - C/W amlodipine 5 mg PO QD      # CAD S/P PCI  - C/W ASA  - Lipitor 20 mg PO QD  - Metoprolol succinate 50 mg PO QD    # Anemia with iron def- started on daily iron tx.      # Elevated Alk Phos  - Likely 2/2 to bone etiology  - GGT wnl    # Constipation  - Miralax BID,  Senna qhs     DVT Prophylaxis: enoxaparin Injectable    -Code Status: Full    #Progress Note Handoff: PT tx. with brace, f/up MM work up. pain management, f/up MR pelvis.  Family discussion: yes, medical team Disposition: STR once medically stable    Total time spent to complete patient's bedside assessment, review medical chart, discuss medical plan of care with covering medical team was more than 35 minutes .     KUR[place]ALO  [facility] F4-4B 027 C ([facility])        Patient was evaluated and examined  by bedside, c/o buttocks pain, tolerating diet well      REVIEW OF SYSTEMS:  please see pertinent positives mentioned above, all other 12 ROS negative      T(C): , Max: 36.2 (09-01-22 @ 06:05)  HR: 71 (09-01-22 @ 06:05)  BP: 184/86 (09-01-22 @ 06:05)  RR: 18 (09-01-22 @ 06:05)  SpO2: 97% (09-01-22 @ 06:05)  CAPILLARY BLOOD GLUCOSE      PHYSICAL EXAM:  General: NAD, AAOX3, patient is laying comfortably in bed, cachectic   HEENT: AT, NC, Supple, NO JVD, NO CB  Lungs: CTA B/L, no wheezing, no rhonchi  CVS: normal S1, S2, RRR, NO M/G/R  Abdomen: soft, bowel sounds present, non-tender, non-distended  Extremities: no edema, no clubbing, no cyanosis, positive peripheral pulses b/l  Neuro: no acute focal neurological deficits, generalized body weakness, lower back/hips pain during mobility  Skin: no rash, no ecchymosis      LAB  CBC  Date: 09-01-22 @ 06:43  Mean cell Hfyintpxgn11.3  Mean cell Hemoglobin Conc31.5  Mean cell Volum 83.4  Platelet count-Automate 468  RBC Count 4.22  Red Cell Distrib Width13.3  WBC Count8.01  % Albumin, Urine--  Hematocrit 35.2  Hemoglobin 11.1  CBC  Date: 08-31-22 @ 06:38  Mean cell Xudmagkxqt93.9  Mean cell Hemoglobin Conc31.8  Mean cell Volum 84.6  Platelet count-Automate 377  RBC Count 4.16  Red Cell Distrib Width13.3  WBC Count5.38  % Albumin, Urine--  Hematocrit 35.2  Hemoglobin 11.2  CBC  Date: 08-30-22 @ 06:48  Mean cell Ukyvdcyqal38.3  Mean cell Hemoglobin Conc32.6  Mean cell Volum 83.9  Platelet count-Automate 315  RBC Count 3.66  Red Cell Distrib Width13.6  WBC Count6.92  % Albumin, Urine--  Hematocrit 30.7  Hemoglobin 10.0      BMP  09-01-22 @ 06:43  Blood Gas Arterial-Calcium,Ionized--  Blood Urea Nitrogen, Serum 16 mg/dL [10 - 20]  Carbon Dioxide, Serum23 mmol/L [17 - 32]  Chloride, Yymdi614 mmol/L [98 - 110]  Creatinie, Serum0.7 mg/dL [0.7 - 1.5]  Glucose, Tbdjh158 mg/dL<H> [70 - 99]  Potassium, Serum4.8 mmol/L [3.5 - 5.0]  Sodium, Serum 136 mmol/L [135 - 146]  BMP  08-31-22 @ 06:38  Blood Gas Arterial-Calcium,Ionized--  Blood Urea Nitrogen, Serum 17 mg/dL [10 - 20]  Carbon Dioxide, Serum18 mmol/L [17 - 32]  Chloride, Ovzdu409 mmol/L [98 - 110]  Creatinie, Serum0.7 mg/dL [0.7 - 1.5]  Glucose, Hekpa854 mg/dL<H> [70 - 99]  Potassium, Serum4.6 mmol/L [3.5 - 5.0] [Slighty Hemolyzed use with Caution]  Sodium, Serum 137 mmol/L [135 - 146]        Microbiology:    Culture - Urine (collected 08-28-22 @ 18:36)  Source: Clean Catch Clean Catch (Midstream)  Final Report (08-29-22 @ 23:01):    <10,000 CFU/mL Normal Urogenital Mely      Medications:  acetaminophen     Tablet .. 650 milliGRAM(s) Oral every 6 hours PRN  amLODIPine   Tablet 5 milliGRAM(s) Oral daily  aspirin  chewable 81 milliGRAM(s) Oral daily  atorvastatin 20 milliGRAM(s) Oral at bedtime  carbidopa/levodopa  25/250 1 Tablet(s) Oral four times a day  carbidopa/levodopa CR 25/100 1.5 Tablet(s) Oral <User Schedule>  enoxaparin Injectable 40 milliGRAM(s) SubCutaneous every 24 hours  ibuprofen  Tablet. 400 milliGRAM(s) Oral every 6 hours PRN  LORazepam   Injectable 1 milliGRAM(s) IV Push once PRN  metoprolol succinate ER 50 milliGRAM(s) Oral daily  pantoprazole    Tablet 40 milliGRAM(s) Oral before breakfast  polyethylene glycol 3350 17 Gram(s) Oral two times a day  selegiline Oral Tab/Cap 5 milliGRAM(s) Oral <User Schedule>  senna 2 Tablet(s) Oral at bedtime        Assessment and Plan:  Patient is a 76 y/o  Male  w/ PMHx of Parkinson's, back surgery (2019), cardiac stent placement (2005) presents for evaluation of multiple falls w/ back pain.    # Back pain S/P Fall  # Acute bilateral sacral/acute and chronic thoracic/lumbar compression fractures   - CT showed Severe T10 and moderate to severe at T5 vertebral body compression fracture of indeterminate age. Severe compression deformity of L2 vertebral body of indeterminate age.  - Neurosurgery following and Orthopedic Sx. following - rec.  No acute  intervention at present time  - MRI T/L spine- multiple compression fx concern for MM, acute bilateral sacral fractures, concern for myositis     - UPEP/SPEP ordered,  Neurosurgery spoke with pt's son Dr. Gomez - a orthopedic doc - now no plans for neurosurgery at [facility]  - Abdominal binder / TLSO   - Pain control   - CT head does not show acute bleed.   - Fall precautions / - PT/OT with brace as tolerated   - f/up MR of Pelvis    # Urinary incontinency - improving after large bowel movement   - Likely Overflow based on hx  - Kidney bladder US with PVR      # Parkinson's disease  - C/W home parkinsons medication    # HTN- unc. ? due to pain, will optimize pain control  - C/W amlodipine 5 mg PO QD, Metoprolol ER 50 mg po once daily      # CAD S/P PCI  - C/W ASA  - Lipitor 20 mg PO QD  - Metoprolol succinate 50 mg PO QD    # Anemia with iron def- started on daily iron tx.      # Elevated Alk Phos  - Likely 2/2 to bone etiology  - GGT wnl    # Constipation  - Miralax BID,  Senna qhs     DVT Prophylaxis: enoxaparin Injectable    -Code Status: Full    #Progress Note Handoff: PT tx. with brace, f/up MM work up. pain management, f/up MR pelvis.  Family discussion: yes, medical team Disposition: STR once medically stable    Total time spent to complete patient's bedside assessment, review medical chart, discuss medical plan of care with covering medical team was more than 35 minutes .

## 2022-09-02 LAB
% ALBUMIN: 47.1 % — SIGNIFICANT CHANGE UP
% ALPHA 1: 8 % — SIGNIFICANT CHANGE UP
% ALPHA 2: 13.3 % — SIGNIFICANT CHANGE UP
% BETA: 13.3 % — SIGNIFICANT CHANGE UP
% GAMMA: 18.3 % — SIGNIFICANT CHANGE UP
ALBUMIN SERPL ELPH-MCNC: 3.2 G/DL — LOW (ref 3.6–5.5)
ALBUMIN SERPL ELPH-MCNC: 3.6 G/DL — SIGNIFICANT CHANGE UP (ref 3.5–5.2)
ALBUMIN/GLOB SERPL ELPH: 0.9 RATIO — SIGNIFICANT CHANGE UP
ALP SERPL-CCNC: 131 U/L — HIGH (ref 30–115)
ALPHA1 GLOB SERPL ELPH-MCNC: 0.5 G/DL — HIGH (ref 0.1–0.4)
ALPHA2 GLOB SERPL ELPH-MCNC: 0.9 G/DL — SIGNIFICANT CHANGE UP (ref 0.5–1)
ALT FLD-CCNC: <5 U/L — SIGNIFICANT CHANGE UP (ref 0–41)
ANION GAP SERPL CALC-SCNC: 10 MMOL/L — SIGNIFICANT CHANGE UP (ref 7–14)
AST SERPL-CCNC: 15 U/L — SIGNIFICANT CHANGE UP (ref 0–41)
B-GLOBULIN SERPL ELPH-MCNC: 0.9 G/DL — SIGNIFICANT CHANGE UP (ref 0.5–1)
BILIRUB SERPL-MCNC: <0.2 MG/DL — SIGNIFICANT CHANGE UP (ref 0.2–1.2)
BUN SERPL-MCNC: 23 MG/DL — HIGH (ref 10–20)
CALCIUM SERPL-MCNC: 9.4 MG/DL — SIGNIFICANT CHANGE UP (ref 8.5–10.1)
CHLORIDE SERPL-SCNC: 101 MMOL/L — SIGNIFICANT CHANGE UP (ref 98–110)
CO2 SERPL-SCNC: 23 MMOL/L — SIGNIFICANT CHANGE UP (ref 17–32)
CREAT SERPL-MCNC: 0.7 MG/DL — SIGNIFICANT CHANGE UP (ref 0.7–1.5)
EGFR: 94 ML/MIN/1.73M2 — SIGNIFICANT CHANGE UP
GAMMA GLOBULIN: 1.2 G/DL — SIGNIFICANT CHANGE UP (ref 0.6–1.6)
GLUCOSE SERPL-MCNC: 93 MG/DL — SIGNIFICANT CHANGE UP (ref 70–99)
HCT VFR BLD CALC: 31.1 % — LOW (ref 42–52)
HGB BLD-MCNC: 9.9 G/DL — LOW (ref 14–18)
MAGNESIUM SERPL-MCNC: 2 MG/DL — SIGNIFICANT CHANGE UP (ref 1.8–2.4)
MCHC RBC-ENTMCNC: 27 PG — SIGNIFICANT CHANGE UP (ref 27–31)
MCHC RBC-ENTMCNC: 31.8 G/DL — LOW (ref 32–37)
MCV RBC AUTO: 84.7 FL — SIGNIFICANT CHANGE UP (ref 80–94)
NRBC # BLD: 0 /100 WBCS — SIGNIFICANT CHANGE UP (ref 0–0)
PLATELET # BLD AUTO: 440 K/UL — HIGH (ref 130–400)
POTASSIUM SERPL-MCNC: 4.8 MMOL/L — SIGNIFICANT CHANGE UP (ref 3.5–5)
POTASSIUM SERPL-SCNC: 4.8 MMOL/L — SIGNIFICANT CHANGE UP (ref 3.5–5)
PROT PATTERN SERPL ELPH-IMP: SIGNIFICANT CHANGE UP
PROT SERPL-MCNC: 6.3 G/DL — SIGNIFICANT CHANGE UP (ref 6–8)
RBC # BLD: 3.67 M/UL — LOW (ref 4.7–6.1)
RBC # FLD: 13.2 % — SIGNIFICANT CHANGE UP (ref 11.5–14.5)
SARS-COV-2 RNA SPEC QL NAA+PROBE: SIGNIFICANT CHANGE UP
SODIUM SERPL-SCNC: 134 MMOL/L — LOW (ref 135–146)
WBC # BLD: 9.47 K/UL — SIGNIFICANT CHANGE UP (ref 4.8–10.8)
WBC # FLD AUTO: 9.47 K/UL — SIGNIFICANT CHANGE UP (ref 4.8–10.8)

## 2022-09-02 PROCEDURE — 99233 SBSQ HOSP IP/OBS HIGH 50: CPT

## 2022-09-02 PROCEDURE — 72195 MRI PELVIS W/O DYE: CPT | Mod: 26

## 2022-09-02 RX ORDER — TRAMADOL HYDROCHLORIDE 50 MG/1
25 TABLET ORAL EVERY 6 HOURS
Refills: 0 | Status: DISCONTINUED | OUTPATIENT
Start: 2022-09-02 | End: 2022-09-02

## 2022-09-02 RX ORDER — TRAMADOL HYDROCHLORIDE 50 MG/1
25 TABLET ORAL EVERY 6 HOURS
Refills: 0 | Status: DISCONTINUED | OUTPATIENT
Start: 2022-09-02 | End: 2022-09-04

## 2022-09-02 RX ORDER — FERROUS SULFATE 325(65) MG
325 TABLET ORAL DAILY
Refills: 0 | Status: DISCONTINUED | OUTPATIENT
Start: 2022-09-02 | End: 2022-09-04

## 2022-09-02 RX ADMIN — Medication 400 MILLIGRAM(S): at 06:50

## 2022-09-02 RX ADMIN — CARBIDOPA AND LEVODOPA 1 TABLET(S): 25; 100 TABLET ORAL at 11:22

## 2022-09-02 RX ADMIN — ATORVASTATIN CALCIUM 20 MILLIGRAM(S): 80 TABLET, FILM COATED ORAL at 22:07

## 2022-09-02 RX ADMIN — CARBIDOPA AND LEVODOPA 1 TABLET(S): 25; 100 TABLET ORAL at 17:11

## 2022-09-02 RX ADMIN — CARBIDOPA AND LEVODOPA 1.5 TABLET(S): 25; 100 TABLET ORAL at 05:22

## 2022-09-02 RX ADMIN — Medication 50 MILLIGRAM(S): at 05:22

## 2022-09-02 RX ADMIN — CARBIDOPA AND LEVODOPA 1.5 TABLET(S): 25; 100 TABLET ORAL at 22:06

## 2022-09-02 RX ADMIN — ENOXAPARIN SODIUM 40 MILLIGRAM(S): 100 INJECTION SUBCUTANEOUS at 22:06

## 2022-09-02 RX ADMIN — PANTOPRAZOLE SODIUM 40 MILLIGRAM(S): 20 TABLET, DELAYED RELEASE ORAL at 06:01

## 2022-09-02 RX ADMIN — Medication 325 MILLIGRAM(S): at 14:37

## 2022-09-02 RX ADMIN — AMLODIPINE BESYLATE 5 MILLIGRAM(S): 2.5 TABLET ORAL at 05:22

## 2022-09-02 RX ADMIN — Medication 1 MILLIGRAM(S): at 08:00

## 2022-09-02 RX ADMIN — CARBIDOPA AND LEVODOPA 1 TABLET(S): 25; 100 TABLET ORAL at 05:22

## 2022-09-02 RX ADMIN — POLYETHYLENE GLYCOL 3350 17 GRAM(S): 17 POWDER, FOR SOLUTION ORAL at 05:22

## 2022-09-02 RX ADMIN — SENNA PLUS 2 TABLET(S): 8.6 TABLET ORAL at 22:07

## 2022-09-02 RX ADMIN — CARBIDOPA AND LEVODOPA 1.5 TABLET(S): 25; 100 TABLET ORAL at 14:36

## 2022-09-02 RX ADMIN — Medication 650 MILLIGRAM(S): at 06:49

## 2022-09-02 RX ADMIN — SELEGILINE HYDROCHLORIDE 5 MILLIGRAM(S): 1.25 TABLET, ORALLY DISINTEGRATING ORAL at 05:22

## 2022-09-02 NOTE — CHART NOTE - NSCHARTNOTEFT_GEN_A_CORE
MRI Addendum  There is an acute fracture from the left greater trochanter extending through at least half of the intertrochanteric ridge (series 6 image 20) with associated bone marrow edema, consistent with intertrochanteric femoral fracture.    The pt was seen and examined today there is no clinical correlation with this MRI finding     therefore earlier recommendations still remain.  - Patient may be WBAT BLE with a walker  - Please obtain lateral XR of the sacrum when patient is ambulatory with PT  - No surgical intervention at this time MRI Addendum  There is an acute fracture from the left greater trochanter extending through at least half of the intertrochanteric ridge (series 6 image 20) with associated bone marrow edema, consistent with intertrochanteric femoral fracture.    The pt was seen and examined today there is no clinical correlation with this MRI finding     therefore earlier recommendations still remain.  - Patient may be WBAT BLE with a walker  - Please obtain lateral XR of the sacrum when patient is ambulatory with PT  - No surgical intervention at this time  pt seen and examined  MRI reviewed  agree with plan

## 2022-09-02 NOTE — PROGRESS NOTE ADULT - SUBJECTIVE AND OBJECTIVE BOX
HPI:  77 y M with a PMHx of Parkinson's disease, Hx of back surgery (2019), H/O cardiac stent placement 2005, H/O esophogeal ulcers HTN, Osteoporosis. The patient came in for evaluation of multiple falls and back pain.  The patient is accompanied by his wife stating he had two falls 2 weeks ago, one was down several steps and the other he fell and struck his right knee. The patient had a dizzy and lightheaded sensation prior to one of the falls, but there was no LOC, urinary/fecal incontinence or post episode confusion. The second one was purely mechanical when he slipped going up a flight of stairs. The patient has been participating in physical therapy for his on-going back issues and worsening Parkinson's disease, noting that his back pain has progressively worsened and has associated shooting pain down both legs. The patient came to ED today as his wife states its been very difficult for her to take care of him and he also started to develop urinary incontinence. The urinary incontinence started a few days ago. There is no overwhelming urge to urinate and patient looses urine all of a sudden without realizing it. The patient [place], Pan scan was performed demonstrating right 8th, left 9th rib fractures, acute fracture of right and left aspect of sacrum and several age indeterminate Thoracic compression fractures.    The patient was evaluated by trauma and neurosurgery teams and will be admitted to medicine for W/U of recurrent falls.    (28 Aug 2022 22:18)    Currently admitted to medicine with the primary diagnosis of Fall       Today is hospital day 5d.     INTERVAL HPI / OVERNIGHT EVENTS:  Patient was examined and seen at bedside. This morning he is resting comfortably in bed and reports no new issues or overnight events.     ROS: Otherwise unremarkable     PAST MEDICAL & SURGICAL HISTORY  Parkinsons    History of back surgery    H/O heart artery stent      ALLERGIES  No Known Allergies    MEDICATIONS  STANDING MEDICATIONS  atorvastatin 20 milliGRAM(s) Oral at bedtime  carbidopa/levodopa  25/250 1 Tablet(s) Oral four times a day  carbidopa/levodopa CR 25/100 1.5 Tablet(s) Oral <User Schedule>  enoxaparin Injectable 40 milliGRAM(s) SubCutaneous every 24 hours  ferrous    sulfate 325 milliGRAM(s) Oral daily  metoprolol succinate ER 50 milliGRAM(s) Oral daily  pantoprazole    Tablet 40 milliGRAM(s) Oral before breakfast  polyethylene glycol 3350 17 Gram(s) Oral two times a day  selegiline Oral Tab/Cap 5 milliGRAM(s) Oral <User Schedule>  senna 2 Tablet(s) Oral at bedtime    PRN MEDICATIONS  acetaminophen     Tablet .. 650 milliGRAM(s) Oral every 6 hours PRN  ibuprofen  Tablet. 400 milliGRAM(s) Oral every 6 hours PRN  traMADol 25 milliGRAM(s) Oral every 6 hours PRN    VITALS:  T(F): 99.3  HR: 94  BP: 99/56  RR: 19  SpO2: 100%    PHYSICAL EXAM  GEN: NAD, Resting comfortably in bed  PULM: Clear to auscultation bilaterally, No wheezes  CVS: Regular rate and rhythm, S1-S2, no murmurs  ABD: Soft, non-tender, non-distended, no guarding  EXT: No edema  NEURO: A&Ox3, no focal deficits      LABS                        11.1   8.01  )-----------( 468      ( 01 Sep 2022 06:43 )             35.2     09-01    136  |  102  |  16  ----------------------------<  104<H>  4.8   |  23  |  0.7    Ca    9.7      01 Sep 2022 06:43  Mg     2.2     09-01    TPro  7.0  /  Alb  3.8  /  TBili  0.2  /  DBili  x   /  AST  14  /  ALT  <5  /  AlkPhos  141<H>  09-01              Culture - Blood (collected 01 Sep 2022 06:43)  Source: .Blood None  Preliminary Report (02 Sep 2022 12:01):    No growth to date.          RADIOLOGY    < from: MR Pelvis Bony Only No Cont (09.02.22 @ 08:22) >      ACC: [id number] EXAM:  MR PELVIS BONY ONLY                          *** ADDENDUM***    There is an acute fracture from the left greater trochanter extending   through at least half of the intertrochanteric ridge (series 6 image 20)   with associated bone marrow edema, consistent with intertrochanteric   femoral fracture.    Spoke with SARAH DARBY on 9/2/2022 10:40 AM with readback.    --- End of Report ---    *** END OF ADDENDUM***      PROCEDURE DATE:  09/02/2022          INTERPRETATION:  Clinical History / Reason for exam: Evaluate concerning   findings on prior MRI at the sacroiliac joint.    TECHNIQUE: Multiplanar multisequence MRI of the bony pelvis without   intravenous contrast was performed. This is an incomplete exam, as   patient was unable to tolerate the exam.    Correlation: MRI of the lumbar spine August 29, 2022    FINDINGS:    Image quality is further degraded by motion artifact.    Again seen is acute sacral fracture with bone marrow edema. There is   edema within the region of the bilateral iliopsoas muscles without   drainable fluid collection on this noncontrast exam.    Again seen is dorsal paraspinal muscular edema.    There are degenerative changes of the bilateral sacroiliac joints and   bilateral hips. Partially imaged is lower lumbar posterior fusion   hardware.    Visualized intrapelvic structures are unremarkable.    IMPRESSION:    Incomplete exam as patient was unable to tolerate.    Reidentified acute sacral fractures, dorsal paraspinal muscular edema,   and edema within the region of the bilateral iliopsoas muscles without   drainable fluid collection on this noncontrast exam.    --- End of Report ---    ***Please see the addendum at the top of this report. It may contain   additional important information or changes.****        NELLY MARIN MD; Attending Radiologist  This document has been electronically signed. Sep  2 2022  9:19AM  Addend:NELLY MARIN MD; Attending Radiologist  This addendum was electronically signed on: Sep  2 2022 10:41AM.      < end of copied text >

## 2022-09-02 NOTE — PROGRESS NOTE ADULT - SUBJECTIVE AND OBJECTIVE BOX
LALO GOMEZ  [facility] [address] ([facility])      Patient was evaluated and examined  by bedside, patient is sitting in a chair with Back brace on, c/o lower back, pelvic pain       REVIEW OF SYSTEMS:  please see pertinent positives mentioned above, all other 12 ROS negative      T(C): , Max: 37.4 (09-02-22 @ 08:19)  HR: 94 (09-02-22 @ 08:19)  BP: 99/56 (09-02-22 @ 08:19)  RR: 19 (09-02-22 @ 08:19)  SpO2: 100% (09-01-22 @ 15:28)  CAPILLARY BLOOD GLUCOSE    PHYSICAL EXAM:  General: NAD, AAOX3, patient is sitting  in a chair, cachectic   HEENT: AT, NC, Supple, NO JVD, NO CB  Lungs: CTA B/L, no wheezing, no rhonchi  CVS: normal S1, S2, RRR, NO M/G/R  Abdomen: soft, bowel sounds present, non-tender, non-distended  Extremities: no edema, no clubbing, no cyanosis, positive peripheral pulses b/l  Neuro: generalized weakness, sensory intact, occasional hand tremors present, gait not tested   Skin: no rash, no ecchymosis      LAB  CBC  Date: 09-01-22 @ 06:43  Mean cell Xhsbzmpbbp02.3  Mean cell Hemoglobin Conc31.5  Mean cell Volum 83.4  Platelet count-Automate 468  RBC Count 4.22  Red Cell Distrib Width13.3  WBC Count8.01  % Albumin, Urine--  Hematocrit 35.2  Hemoglobin 11.1  CBC  Date: 08-31-22 @ 06:38  Mean cell Muafoyxnul14.9  Mean cell Hemoglobin Conc31.8  Mean cell Volum 84.6  Platelet count-Automate 377  RBC Count 4.16  Red Cell Distrib Width13.3  WBC Count5.38  % Albumin, Urine--  Hematocrit 35.2  Hemoglobin 11.2      BMP  09-01-22 @ 06:43  Blood Gas Arterial-Calcium,Ionized--  Blood Urea Nitrogen, Serum 16 mg/dL [10 - 20]  Carbon Dioxide, Serum23 mmol/L [17 - 32]  Chloride, Mzeos388 mmol/L [98 - 110]  Creatinie, Serum0.7 mg/dL [0.7 - 1.5]  Glucose, Yfahu123 mg/dL<H> [70 - 99]  Potassium, Serum4.8 mmol/L [3.5 - 5.0]  Sodium, Serum 136 mmol/L [135 - 146]  [facility]  08-31-22 @ 06:38  Blood Gas Arterial-Calcium,Ionized--  Blood Urea Nitrogen, Serum 17 mg/dL [10 - 20]  Carbon Dioxide, Serum18 mmol/L [17 - 32]  Chloride, Tyggd397 mmol/L [98 - 110]  Creatinie, Serum0.7 mg/dL [0.7 - 1.5]  Glucose, Jkjgv494 mg/dL<H> [70 - 99]  Potassium, Serum4.6 mmol/L [3.5 - 5.0] [Slighty Hemolyzed use with Caution]  Sodium, Serum 137 mmol/L [135 - 146]        Microbiology:    Culture - Urine (collected 08-28-22 @ 18:36)  Source: Clean Catch Clean Catch (Midstream)  Final Report (08-29-22 @ 23:01):    <10,000 CFU/mL Normal Urogenital Mely        Medications:  acetaminophen     Tablet .. 650 milliGRAM(s) Oral every 6 hours PRN  amLODIPine   Tablet 5 milliGRAM(s) Oral daily  aspirin  chewable 81 milliGRAM(s) Oral daily  atorvastatin 20 milliGRAM(s) Oral at bedtime  carbidopa/levodopa  25/250 1 Tablet(s) Oral four times a day  carbidopa/levodopa CR 25/100 1.5 Tablet(s) Oral <User Schedule>  enoxaparin Injectable 40 milliGRAM(s) SubCutaneous every 24 hours  ibuprofen  Tablet. 400 milliGRAM(s) Oral every 6 hours PRN  metoprolol succinate ER 50 milliGRAM(s) Oral daily  pantoprazole    Tablet 40 milliGRAM(s) Oral before breakfast  polyethylene glycol 3350 17 Gram(s) Oral two times a day  selegiline Oral Tab/Cap 5 milliGRAM(s) Oral <User Schedule>  senna 2 Tablet(s) Oral at bedtime  traMADol 25 milliGRAM(s) Oral every 6 hours PRN        Assessment and Plan:  Patient is a 78 y/o  Male  w/ PMHx of Parkinson's, back surgery (2019), cardiac stent placement (2005) presents for evaluation of multiple falls w/ back pain.    # Back pain S/P Fall  # Acute bilateral sacral/acute and chronic thoracic/lumbar compression fractures   # Acute Left femoral intertrochanteric  fracture  - CT showed Severe T10 and moderate to severe at T5 vertebral body compression fracture of indeterminate age. Severe compression deformity of L2 vertebral body of indeterminate age.  - CT head does not show acute bleed.   - Neurosurgery following  - rec.  No acute  intervention at present time  - MRI T/L spine- multiple compression fx concern for MM, acute bilateral sacral fractures, concern for myositis     - SPEP - negative for abn. paraproteins   - Neurosurgery spoke with pt's son Dr. Gomez - a orthopedic doc - for  now no plans for neurosurgery at [facility]  - Abdominal binder / TLSO /- Pain control /- Fall precautions / - PT/OT with brace as tolerated   - s/p  MR of Pelvis- acute left intertrochanteric fracture, sacral fractures with soft tissue edema  -9/2- f/up with Orthopedic Surgeon for acute left femoral fracture    # Urinary incontinency - improving after large bowel movement   - Likely Overflow based on hx  - Kidney bladder US with PVR      # Parkinson's disease  - C/W home parkinsons medication    # HTN- low b/p d/c norvasc, continue Metoprolol ER 50 mg po once daily      # CAD S/P PCI  - C/W ASA  - Lipitor 20 mg PO QD  - Metoprolol succinate 50 mg PO QD    # Anemia with iron def- started on daily iron tx.      # Elevated Alk Phos  - Likely 2/2 to bone etiology  - GGT wnl    # Constipation  - Miralax BID,  Senna qhs     DVT Prophylaxis: enoxaparin Injectable    -Code Status: Full    #Progress Note Handoff: f/up Orthopedic Surgery for rec on left femoral fracture  Family discussion: yes, medical team Disposition: STR once medically stable    Total time spent to complete patient's bedside assessment, review medical chart, discuss medical plan of care with covering medical team was more than 35 minutes .

## 2022-09-03 LAB
ALBUMIN SERPL ELPH-MCNC: 3.6 G/DL — SIGNIFICANT CHANGE UP (ref 3.5–5.2)
ALP SERPL-CCNC: 137 U/L — HIGH (ref 30–115)
ALT FLD-CCNC: <5 U/L — SIGNIFICANT CHANGE UP (ref 0–41)
ANION GAP SERPL CALC-SCNC: 10 MMOL/L — SIGNIFICANT CHANGE UP (ref 7–14)
ANION GAP SERPL CALC-SCNC: 6 MMOL/L — LOW (ref 7–14)
AST SERPL-CCNC: 14 U/L — SIGNIFICANT CHANGE UP (ref 0–41)
BASOPHILS # BLD AUTO: 0.07 K/UL — SIGNIFICANT CHANGE UP (ref 0–0.2)
BASOPHILS NFR BLD AUTO: 0.8 % — SIGNIFICANT CHANGE UP (ref 0–1)
BILIRUB SERPL-MCNC: 0.2 MG/DL — SIGNIFICANT CHANGE UP (ref 0.2–1.2)
BUN SERPL-MCNC: 22 MG/DL — HIGH (ref 10–20)
BUN SERPL-MCNC: 26 MG/DL — HIGH (ref 10–20)
CALCIUM SERPL-MCNC: 9.3 MG/DL — SIGNIFICANT CHANGE UP (ref 8.5–10.1)
CALCIUM SERPL-MCNC: 9.3 MG/DL — SIGNIFICANT CHANGE UP (ref 8.5–10.1)
CHLORIDE SERPL-SCNC: 101 MMOL/L — SIGNIFICANT CHANGE UP (ref 98–110)
CHLORIDE SERPL-SCNC: 98 MMOL/L — SIGNIFICANT CHANGE UP (ref 98–110)
CO2 SERPL-SCNC: 23 MMOL/L — SIGNIFICANT CHANGE UP (ref 17–32)
CO2 SERPL-SCNC: 25 MMOL/L — SIGNIFICANT CHANGE UP (ref 17–32)
CREAT SERPL-MCNC: 0.6 MG/DL — LOW (ref 0.7–1.5)
CREAT SERPL-MCNC: 0.7 MG/DL — SIGNIFICANT CHANGE UP (ref 0.7–1.5)
EGFR: 94 ML/MIN/1.73M2 — SIGNIFICANT CHANGE UP
EGFR: 98 ML/MIN/1.73M2 — SIGNIFICANT CHANGE UP
EOSINOPHIL # BLD AUTO: 0.41 K/UL — SIGNIFICANT CHANGE UP (ref 0–0.7)
EOSINOPHIL NFR BLD AUTO: 4.6 % — SIGNIFICANT CHANGE UP (ref 0–8)
GLUCOSE SERPL-MCNC: 104 MG/DL — HIGH (ref 70–99)
GLUCOSE SERPL-MCNC: 114 MG/DL — HIGH (ref 70–99)
HCT VFR BLD CALC: 31.1 % — LOW (ref 42–52)
HGB BLD-MCNC: 9.9 G/DL — LOW (ref 14–18)
IMM GRANULOCYTES NFR BLD AUTO: 2 % — HIGH (ref 0.1–0.3)
INTERPRETATION SERPL IFE-IMP: SIGNIFICANT CHANGE UP
LYMPHOCYTES # BLD AUTO: 1.48 K/UL — SIGNIFICANT CHANGE UP (ref 1.2–3.4)
LYMPHOCYTES # BLD AUTO: 16.8 % — LOW (ref 20.5–51.1)
MAGNESIUM SERPL-MCNC: 2.1 MG/DL — SIGNIFICANT CHANGE UP (ref 1.8–2.4)
MCHC RBC-ENTMCNC: 27 PG — SIGNIFICANT CHANGE UP (ref 27–31)
MCHC RBC-ENTMCNC: 31.8 G/DL — LOW (ref 32–37)
MCV RBC AUTO: 84.7 FL — SIGNIFICANT CHANGE UP (ref 80–94)
MONOCYTES # BLD AUTO: 1.07 K/UL — HIGH (ref 0.1–0.6)
MONOCYTES NFR BLD AUTO: 12.1 % — HIGH (ref 1.7–9.3)
NEUTROPHILS # BLD AUTO: 5.62 K/UL — SIGNIFICANT CHANGE UP (ref 1.4–6.5)
NEUTROPHILS NFR BLD AUTO: 63.7 % — SIGNIFICANT CHANGE UP (ref 42.2–75.2)
NRBC # BLD: 0 /100 WBCS — SIGNIFICANT CHANGE UP (ref 0–0)
PLATELET # BLD AUTO: 434 K/UL — HIGH (ref 130–400)
POTASSIUM SERPL-MCNC: 4.6 MMOL/L — SIGNIFICANT CHANGE UP (ref 3.5–5)
POTASSIUM SERPL-MCNC: 4.9 MMOL/L — SIGNIFICANT CHANGE UP (ref 3.5–5)
POTASSIUM SERPL-SCNC: 4.6 MMOL/L — SIGNIFICANT CHANGE UP (ref 3.5–5)
POTASSIUM SERPL-SCNC: 4.9 MMOL/L — SIGNIFICANT CHANGE UP (ref 3.5–5)
PROT SERPL-MCNC: 6.6 G/DL — SIGNIFICANT CHANGE UP (ref 6–8)
RBC # BLD: 3.67 M/UL — LOW (ref 4.7–6.1)
RBC # FLD: 13.4 % — SIGNIFICANT CHANGE UP (ref 11.5–14.5)
SODIUM SERPL-SCNC: 129 MMOL/L — LOW (ref 135–146)
SODIUM SERPL-SCNC: 134 MMOL/L — LOW (ref 135–146)
WBC # BLD: 8.83 K/UL — SIGNIFICANT CHANGE UP (ref 4.8–10.8)
WBC # FLD AUTO: 8.83 K/UL — SIGNIFICANT CHANGE UP (ref 4.8–10.8)

## 2022-09-03 PROCEDURE — 99233 SBSQ HOSP IP/OBS HIGH 50: CPT

## 2022-09-03 RX ORDER — FERROUS SULFATE 325(65) MG
1 TABLET ORAL
Qty: 0 | Refills: 0 | DISCHARGE
Start: 2022-09-03

## 2022-09-03 RX ORDER — SENNA PLUS 8.6 MG/1
2 TABLET ORAL
Qty: 0 | Refills: 0 | DISCHARGE
Start: 2022-09-03

## 2022-09-03 RX ORDER — ENOXAPARIN SODIUM 100 MG/ML
40 INJECTION SUBCUTANEOUS
Qty: 0 | Refills: 0 | DISCHARGE
Start: 2022-09-03

## 2022-09-03 RX ORDER — ACETAMINOPHEN 500 MG
2 TABLET ORAL
Qty: 0 | Refills: 0 | DISCHARGE
Start: 2022-09-03

## 2022-09-03 RX ORDER — IBUPROFEN 200 MG
1 TABLET ORAL
Qty: 0 | Refills: 0 | DISCHARGE
Start: 2022-09-03

## 2022-09-03 RX ORDER — TRAMADOL HYDROCHLORIDE 50 MG/1
0.5 TABLET ORAL
Qty: 0 | Refills: 0 | DISCHARGE
Start: 2022-09-03

## 2022-09-03 RX ORDER — POLYETHYLENE GLYCOL 3350 17 G/17G
17 POWDER, FOR SOLUTION ORAL
Qty: 0 | Refills: 0 | DISCHARGE
Start: 2022-09-03

## 2022-09-03 RX ORDER — SODIUM CHLORIDE 9 MG/ML
1000 INJECTION INTRAMUSCULAR; INTRAVENOUS; SUBCUTANEOUS
Refills: 0 | Status: DISCONTINUED | OUTPATIENT
Start: 2022-09-03 | End: 2022-09-03

## 2022-09-03 RX ADMIN — Medication 50 MILLIGRAM(S): at 06:55

## 2022-09-03 RX ADMIN — CARBIDOPA AND LEVODOPA 1.5 TABLET(S): 25; 100 TABLET ORAL at 21:26

## 2022-09-03 RX ADMIN — Medication 650 MILLIGRAM(S): at 10:19

## 2022-09-03 RX ADMIN — CARBIDOPA AND LEVODOPA 1 TABLET(S): 25; 100 TABLET ORAL at 11:23

## 2022-09-03 RX ADMIN — CARBIDOPA AND LEVODOPA 1.5 TABLET(S): 25; 100 TABLET ORAL at 06:56

## 2022-09-03 RX ADMIN — CARBIDOPA AND LEVODOPA 1 TABLET(S): 25; 100 TABLET ORAL at 23:57

## 2022-09-03 RX ADMIN — Medication 400 MILLIGRAM(S): at 12:07

## 2022-09-03 RX ADMIN — SELEGILINE HYDROCHLORIDE 5 MILLIGRAM(S): 1.25 TABLET, ORALLY DISINTEGRATING ORAL at 06:55

## 2022-09-03 RX ADMIN — CARBIDOPA AND LEVODOPA 1 TABLET(S): 25; 100 TABLET ORAL at 06:55

## 2022-09-03 RX ADMIN — CARBIDOPA AND LEVODOPA 1.5 TABLET(S): 25; 100 TABLET ORAL at 14:06

## 2022-09-03 RX ADMIN — Medication 650 MILLIGRAM(S): at 12:05

## 2022-09-03 RX ADMIN — CARBIDOPA AND LEVODOPA 1 TABLET(S): 25; 100 TABLET ORAL at 18:20

## 2022-09-03 RX ADMIN — PANTOPRAZOLE SODIUM 40 MILLIGRAM(S): 20 TABLET, DELAYED RELEASE ORAL at 06:55

## 2022-09-03 RX ADMIN — Medication 400 MILLIGRAM(S): at 10:06

## 2022-09-03 RX ADMIN — ATORVASTATIN CALCIUM 20 MILLIGRAM(S): 80 TABLET, FILM COATED ORAL at 21:31

## 2022-09-03 RX ADMIN — ENOXAPARIN SODIUM 40 MILLIGRAM(S): 100 INJECTION SUBCUTANEOUS at 21:31

## 2022-09-03 RX ADMIN — Medication 325 MILLIGRAM(S): at 11:24

## 2022-09-03 RX ADMIN — TRAMADOL HYDROCHLORIDE 25 MILLIGRAM(S): 50 TABLET ORAL at 18:18

## 2022-09-03 RX ADMIN — CARBIDOPA AND LEVODOPA 1 TABLET(S): 25; 100 TABLET ORAL at 00:12

## 2022-09-03 NOTE — DISCHARGE NOTE PROVIDER - CARE PROVIDER_API CALL
Carl Pal)  Surgery  Neurosurgery  [address] [place] [address], [address]  [place] [address]  Phone: [phone]  Fax: [phone]  Follow Up Time: 2 weeks    Altaf Lomeli)  Orthopaedic Surgery  [address] [place] [address]  Phone: [phone]  Fax: [phone]  Follow Up Time: 2 weeks

## 2022-09-03 NOTE — DISCHARGE NOTE PROVIDER - HOSPITAL COURSE
Patient is a 78 y/o  Male  w/ PMHx of Parkinson's, back surgery (2019), cardiac stent placement (2005) presents for evaluation of multiple falls w/ back pain.    # Back pain S/P Fall  # Acute bilateral sacral/acute and chronic thoracic/lumbar compression fractures   # Acute Left femoral intertrochanteric  fracture  - CT showed Severe T10 and moderate to severe at T5 vertebral body compression fracture of indeterminate age. Severe compression deformity of L2 vertebral body of indeterminate age.  - CT head does not show acute bleed.   - Neurosurgery following  - rec.  No acute  intervention at present time  - MRI T/L spine- multiple compression fx concern for MM, acute bilateral sacral fractures, concern for myositis     - SPEP - negative for abn. paraproteins   - Neurosurgery spoke with pt's son Dr. Gomez - a orthopedic doc - for  now no plans for neurosurgery at [facility]  - Abdominal binder / TLSO /- Pain control /- Fall precautions / - PT/OT with brace as tolerated   - s/p  MR of Pelvis- acute left intertrochanteric fracture, sacral fractures with soft tissue edema  -9/2- as per  Orthopedic Surgery specialist f/up  for acute left femoral fracture- no inpatient surgical interventions is recommended, to continue with WBAT, PT , pain control  -Patient was instructed to f/up with Orthopedic Surgeon in 2 weeks post d/c to STR to assess healing process.     # Mild Hyponatremia due to pain, po intake, continue close outpatient monitoring of BMP    # Urinary incontinency - improving after large bowel movement   - Likely Overflow based on hx  - Kidney bladder US with PVR      # Parkinson's disease  - C/W home parkinsons medication    # HTN- improved b/p post  d/c norvasc, continue Metoprolol ER 50 mg po once daily      # CAD S/P PCI  - C/W ASA  - Lipitor 20 mg PO QD  - Metoprolol succinate 50 mg PO QD    # Anemia with iron def- started on daily iron tx.      # Elevated Alk Phos  - Likely 2/2 to bone etiology  - GGT wnl    # Constipation  - Miralax BID,  Senna qhs    Patient was medically optimized, stable for d/c to STR for PT/OT tx.   Patient is a 78 y/o  Male  w/ PMHx of Parkinson's, back surgery (2019), cardiac stent placement (2005) presents for evaluation of multiple falls w/ back pain.    # Back pain S/P Fall  # Acute bilateral sacral/acute and chronic thoracic/lumbar compression fractures   # Acute Left femoral intertrochanteric  fracture  - CT showed Severe T10 and moderate to severe at T5 vertebral body compression fracture of indeterminate age. Severe compression deformity of L2 vertebral body of indeterminate age.  - CT head does not show acute bleed.   - Neurosurgery following  - rec.  No acute  intervention at present time  - MRI T/L spine- multiple compression fx concern for MM, acute bilateral sacral fractures, concern for myositis     - SPEP - negative for abn. paraproteins   - Neurosurgery spoke with pt's son Dr. Gomez - a orthopedic doc - for  now no plans for neurosurgery at [facility]  - Abdominal binder / TLSO /- Pain control /- Fall precautions / - PT/OT with brace as tolerated   - s/p  MR of Pelvis- acute left intertrochanteric fracture, sacral fractures with soft tissue edema  -9/2- as per  Orthopedic Surgery specialist f/up  for acute left femoral fracture- no inpatient surgical intervention at [facility], patient's Son who is an Orthopedic Surgeon at \[facility]\"" planning to transfer patient to \[facility]\"" with possible Surgical Repair of left hip fracture on Tuesday Sept. 6th.   -for now NWB to left lower ext.     # Mild Hyponatremia due to pain, po intake, continue close outpatient monitoring of BMP    # Urinary incontinency - improving after large bowel movement   - Likely Overflow based on hx  - Kidney bladder US with PVR      # Parkinson's disease  - C/W home parkinsons medication    # HTN- improved b/p post  d/c norvasc, continue Metoprolol ER 50 mg po once daily      # CAD S/P PCI  - C/W ASA  - Lipitor 20 mg PO QD  - Metoprolol succinate 50 mg PO QD    # Anemia with iron def- started on daily iron tx.      # Elevated Alk Phos  - Likely 2/2 to bone etiology  - GGT wnl    # Constipation  - Miralax BID,  Senna qhs    Discharge planning: Patient to be transferred to \[facility]\"" facility for Left hip surgical repair.

## 2022-09-03 NOTE — DISCHARGE NOTE PROVIDER - CARE PROVIDERS DIRECT ADDRESSES
,DirectAddress_Unknown,[email or web address]

## 2022-09-03 NOTE — DISCHARGE NOTE PROVIDER - DETAILS OF MALNUTRITION DIAGNOSIS/DIAGNOSES
This patient has been assessed with a concern for Malnutrition and was treated during this hospitalization for the following Nutrition diagnosis/diagnoses:     -  08/30/2022: Underweight (BMI < 19)

## 2022-09-03 NOTE — DISCHARGE NOTE PROVIDER - NSDCCPCAREPLAN_GEN_ALL_CORE_FT
PRINCIPAL DISCHARGE DIAGNOSIS  Diagnosis: Fall  Assessment and Plan of Treatment: continue PT/OT tx. at Rehab, maintain falls precautions at all times, WBAT , use Back Brace during ambulation  Follow up with Orthopedic Surgeon and Neurosurgery specialists  in 2 weeks post d/c to assess healing process  Monitor B/P and Electrolytes      SECONDARY DISCHARGE DIAGNOSES  Diagnosis: Sacral fracture  Assessment and Plan of Treatment: continue PT/PT, pain management    Diagnosis: Rib fractures  Assessment and Plan of Treatment: MVI, supportive tx.    Diagnosis: Spinal compression fracture  Assessment and Plan of Treatment: outpatient Neurosurgical specialist f/up and Back Brace to be applied during ambulation    Diagnosis: Unable to ambulate  Assessment and Plan of Treatment:      PRINCIPAL DISCHARGE DIAGNOSIS  Diagnosis: Fall  Assessment and Plan of Treatment: continue PT/OT tx. at Rehab post left hip surgical repair, maintain falls precautions at all times, WBAT , use Back Brace during ambulation  Follow up with Orthopedic Surgeon and Neurosurgery specialists  in 2 weeks post d/c to assess healing process  Monitor B/P and Electrolytes      SECONDARY DISCHARGE DIAGNOSES  Diagnosis: Sacral fracture  Assessment and Plan of Treatment: continue PT/PT, pain management    Diagnosis: Rib fractures  Assessment and Plan of Treatment: MVI, supportive tx.    Diagnosis: Spinal compression fracture  Assessment and Plan of Treatment: outpatient Neurosurgical specialist f/up and Back Brace to be applied during ambulation    Diagnosis: Unable to ambulate  Assessment and Plan of Treatment:

## 2022-09-03 NOTE — DISCHARGE NOTE PROVIDER - NSDCMRMEDTOKEN_GEN_ALL_CORE_FT
acetaminophen 325 mg oral tablet: 2 tab(s) orally every 6 hours, As needed, Mild Pain (1 - 3)  amLODIPine 5 mg oral tablet: 1 tab(s) orally once a day  aspirin 81 mg oral tablet: 1 tab(s) orally once a day  atorvastatin 20 mg oral tablet: 1 tab(s) orally once a day  carbidopa-levodopa 25 mg-100 mg oral tablet, extended release: 1.5 tab(s) orally 3 times a day (after meals)  carbidopa-levodopa 25 mg-250 mg oral tablet: 1 tab(s) orally 3 times a day  enoxaparin: 40 milligram(s) subcutaneous once a day (at bedtime)  ferrous sulfate 325 mg (65 mg elemental iron) oral tablet: 1 tab(s) orally once a day  ibuprofen 400 mg oral tablet: 1 tab(s) orally every 6 hours, As needed, Moderate Pain (4 - 6)  Metoprolol Succinate ER 50 mg oral tablet, extended release: 1 tab(s) orally once a day  pantoprazole 40 mg oral delayed release tablet: 1 tab(s) orally once a day  polyethylene glycol 3350 oral powder for reconstitution: 17 gram(s) orally 2 times a day  Reclast:   selegiline 5 mg oral tablet: orally once a day  senna leaf extract oral tablet: 2 tab(s) orally once a day (at bedtime)  traMADol 50 mg oral tablet: 0.5 tab(s) orally every 6 hours, As needed, Severe Pain (7 - 10)  Vitamin C 100 mg oral tablet: 1 tab(s) orally once a day  Vitamin D3 25 mcg (1000 intl units) oral tablet: 1 tab(s) orally once a day   acetaminophen 325 mg oral tablet: 2 tab(s) orally every 6 hours, As needed, Mild Pain (1 - 3)  aspirin 81 mg oral tablet: 1 tab(s) orally once a day  atorvastatin 20 mg oral tablet: 1 tab(s) orally once a day  carbidopa-levodopa 25 mg-100 mg oral tablet, extended release: 1.5 tab(s) orally 3 times a day (after meals)  carbidopa-levodopa 25 mg-250 mg oral tablet: 1 tab(s) orally 3 times a day  enoxaparin: 40 milligram(s) subcutaneous once a day (at bedtime)  ferrous sulfate 325 mg (65 mg elemental iron) oral tablet: 1 tab(s) orally once a day  ibuprofen 400 mg oral tablet: 1 tab(s) orally every 6 hours, As needed, Moderate Pain (4 - 6)  Metoprolol Succinate ER 50 mg oral tablet, extended release: 1 tab(s) orally once a day  pantoprazole 40 mg oral delayed release tablet: 1 tab(s) orally once a day  polyethylene glycol 3350 oral powder for reconstitution: 17 gram(s) orally 2 times a day  Reclast:   selegiline 5 mg oral tablet: orally once a day  senna leaf extract oral tablet: 2 tab(s) orally once a day (at bedtime)  traMADol 50 mg oral tablet: 0.5 tab(s) orally every 6 hours, As needed, Severe Pain (7 - 10)  Vitamin C 100 mg oral tablet: 1 tab(s) orally once a day  Vitamin D3 25 mcg (1000 intl units) oral tablet: 1 tab(s) orally once a day

## 2022-09-03 NOTE — DISCHARGE NOTE PROVIDER - PROVIDER TOKENS
PROVIDER:[TOKEN:[[id number]],FOLLOWUP:[2 weeks]],PROVIDER:[TOKEN:[[id number]],FOLLOWUP:[2 weeks]]

## 2022-09-03 NOTE — DISCHARGE NOTE PROVIDER - NSDCACTIVITY_GEN_ALL_CORE
Weight bearing as tolerated, use Back Brace at all times during OOB activities, maintain falls precautions at all times/Follow Instructions Provided by your Surgical Team No weight bearing on left lower extremity till cleared by Orthopedic Surgeon/Follow Instructions Provided by your Surgical Team

## 2022-09-03 NOTE — PROGRESS NOTE ADULT - SUBJECTIVE AND OBJECTIVE BOX
KURT LALO  [facility] 027 C ([facility])      Patient was evaluated and examined  by bedside, no active events over night as per covering nurse report        REVIEW OF SYSTEMS:  please see pertinent positives mentioned above, all other 12 ROS negative      T(C): , Max: 36.3 (09-02-22 @ 15:18)  HR: 87 (09-03-22 @ 08:00)  BP: 153/74 (09-03-22 @ 08:00)  RR: 18 (09-03-22 @ 08:00)  SpO2: --  CAPILLARY BLOOD GLUCOSE      PHYSICAL EXAM:  General: NAD, AAOX3, patient is sitting  in a chair, cachectic   HEENT: AT, NC, Supple, NO JVD, NO CB  Lungs: CTA B/L, no wheezing, no rhonchi  CVS: normal S1, S2, RRR, NO M/G/R  Abdomen: soft, bowel sounds present, non-tender, non-distended  Extremities: no edema, no clubbing, no cyanosis, positive peripheral pulses b/l  Neuro: generalized weakness, sensory intact, occasional hand tremors present, gait not tested   Skin: no rash, no ecchymosis        LAB  CBC  Date: 09-03-22 @ 09:00  Mean cell Rscsmfqhkw67.0  Mean cell Hemoglobin Conc31.8  Mean cell Volum 84.7  Platelet count-Automate 434  RBC Count 3.67  Red Cell Distrib Width13.4  WBC Count8.83  % Albumin, Urine--  Hematocrit 31.1  Hemoglobin 9.9  CBC  Date: 09-02-22 @ 17:41  Mean cell Defauxzzxv79.0  Mean cell Hemoglobin Conc31.8  Mean cell Volum 84.7  Platelet count-Automate 440  RBC Count 3.67  Red Cell Distrib Width13.2  WBC Count9.47  % Albumin, Urine--  Hematocrit 31.1  Hemoglobin 9.9  CBC  Date: 09-01-22 @ 06:43  Mean cell Civyzojcoe71.3  Mean cell Hemoglobin Conc31.5  Mean cell Volum 83.4  Platelet count-Automate 468  RBC Count 4.22  Red Cell Distrib Width13.3  WBC Count8.01  % Albumin, Urine--  Hematocrit 35.2  Hemoglobin 11.1          BMP  09-03-22 @ 09:00  Blood Gas Arterial-Calcium,Ionized--  Blood Urea Nitrogen, Serum 22 mg/dL<H> [10 - 20]  Carbon Dioxide, Serum25 mmol/L [17 - 32]  Chloride, Serum98 mmol/L [98 - 110]  Creatinie, Serum0.7 mg/dL [0.7 - 1.5]  Glucose, Aaezw954 mg/dL<H> [70 - 99]  Potassium, Serum4.6 mmol/L [3.5 - 5.0]  Sodium, Serum 129 mmol/L<L> [135 - 146]  [facility]  09-02-22 @ 17:41  Blood Gas Arterial-Calcium,Ionized--  Blood Urea Nitrogen, Serum 23 mg/dL<H> [10 - 20]  Carbon Dioxide, Serum23 mmol/L [17 - 32]  Chloride, Gyyrp280 mmol/L [98 - 110]  Creatinie, Serum0.7 mg/dL [0.7 - 1.5]  Glucose, Serum93 mg/dL [70 - 99]  Potassium, Serum4.8 mmol/L [3.5 - 5.0]  Sodium, Serum 134 mmol/L<L> [135 - 146]  [facility]  09-01-22 @ 06:43  Blood Gas Arterial-Calcium,Ionized--  Blood Urea Nitrogen, Serum 16 mg/dL [10 - 20]  Carbon Dioxide, Serum23 mmol/L [17 - 32]  Chloride, Ukpcn480 mmol/L [98 - 110]  Creatinie, Serum0.7 mg/dL [0.7 - 1.5]  Glucose, Umyyf742 mg/dL<H> [70 - 99]  Potassium, Serum4.8 mmol/L [3.5 - 5.0]  Sodium, Serum 136 mmol/L [135 - 146]          Microbiology:    Culture - Blood (collected 09-01-22 @ 06:43)  Source: .Blood None  Preliminary Report (09-02-22 @ 12:01):    No growth to date.    Culture - Urine (collected 08-28-22 @ 18:36)  Source: Clean Catch Clean Catch (Midstream)  Final Report (08-29-22 @ 23:01):    <10,000 CFU/mL Normal Urogenital Mely      Medications:  acetaminophen     Tablet .. 650 milliGRAM(s) Oral every 6 hours PRN  atorvastatin 20 milliGRAM(s) Oral at bedtime  carbidopa/levodopa  25/250 1 Tablet(s) Oral four times a day  carbidopa/levodopa CR 25/100 1.5 Tablet(s) Oral <User Schedule>  enoxaparin Injectable 40 milliGRAM(s) SubCutaneous every 24 hours  ferrous    sulfate 325 milliGRAM(s) Oral daily  ibuprofen  Tablet. 400 milliGRAM(s) Oral every 6 hours PRN  metoprolol succinate ER 50 milliGRAM(s) Oral daily  pantoprazole    Tablet 40 milliGRAM(s) Oral before breakfast  polyethylene glycol 3350 17 Gram(s) Oral two times a day  selegiline Oral Tab/Cap 5 milliGRAM(s) Oral <User Schedule>  senna 2 Tablet(s) Oral at bedtime  traMADol 25 milliGRAM(s) Oral every 6 hours PRN        Assessment and Plan:  Patient is a 76 y/o  Male  w/ PMHx of Parkinson's, back surgery (2019), cardiac stent placement (2005) presents for evaluation of multiple falls w/ back pain.    # Back pain S/P Fall  # Acute bilateral sacral/acute and chronic thoracic/lumbar compression fractures   # Acute Left femoral intertrochanteric  fracture  - CT showed Severe T10 and moderate to severe at T5 vertebral body compression fracture of indeterminate age. Severe compression deformity of L2 vertebral body of indeterminate age.  - CT head does not show acute bleed.   - Neurosurgery following  - rec.  No acute  intervention at present time  - MRI T/L spine- multiple compression fx concern for MM, acute bilateral sacral fractures, concern for myositis     - SPEP - negative for abn. paraproteins   - Neurosurgery spoke with pt's son Dr. Gomez - a orthopedic doc - for  now no plans for neurosurgery at [facility]  - Abdominal binder / TLSO /- Pain control /- Fall precautions / - PT/OT with brace as tolerated   - s/p  MR of Pelvis- acute left intertrochanteric fracture, sacral fractures with soft tissue edema  -9/2- as per  Orthopedic Surgery specialist f/up  for acute left femoral fracture- no inpatient surgical interventions is recommended, to continue with WBAT, PT , pain control  -Patient was instructed to f/up with Orthopedic Surgeon in 2 weeks post d/c to STR to assess healing process.     # Mild Hyponatremia due to pain, po intake, continue close outpatient monitoring of BMP    # Urinary incontinency - improving after large bowel movement   - Likely Overflow based on hx  - Kidney bladder US with PVR      # Parkinson's disease  - C/W home parkinsons medication    # HTN- improved b/p post  d/c norvasc, continue Metoprolol ER 50 mg po once daily      # CAD S/P PCI  - C/W ASA  - Lipitor 20 mg PO QD  - Metoprolol succinate 50 mg PO QD    # Anemia with iron def- started on daily iron tx.      # Elevated Alk Phos  - Likely 2/2 to bone etiology  - GGT wnl    # Constipation  - Miralax BID,  Senna qhs     DVT Prophylaxis: enoxaparin Injectable    -Code Status: Full    #Progress Note Handoff: patient was medically optimized, start d/c to STR planning , f/up case management to assist with d/c   Family discussion: yes, medical team Disposition: STR once bed is available     Total time spent to complete patient's bedside assessment, review medical chart, discuss medical plan of care with covering medical team was more than 35 minutes .         KURT LALO  [facility] 027 C ([facility])      Patient was evaluated and examined  by bedside, no active events over night as per covering nurse report        REVIEW OF SYSTEMS:  please see pertinent positives mentioned above, all other 12 ROS negative      T(C): , Max: 36.3 (09-02-22 @ 15:18)  HR: 87 (09-03-22 @ 08:00)  BP: 153/74 (09-03-22 @ 08:00)  RR: 18 (09-03-22 @ 08:00)  SpO2: --  CAPILLARY BLOOD GLUCOSE      PHYSICAL EXAM:  General: NAD, AAOX3, patient is sitting  in a chair, cachectic   HEENT: AT, NC, Supple, NO JVD, NO CB  Lungs: CTA B/L, no wheezing, no rhonchi  CVS: normal S1, S2, RRR, NO M/G/R  Abdomen: soft, bowel sounds present, non-tender, non-distended  Extremities: no edema, no clubbing, no cyanosis, positive peripheral pulses b/l  Neuro: generalized weakness, sensory intact, occasional hand tremors present, gait not tested   Skin: no rash, no ecchymosis        LAB  CBC  Date: 09-03-22 @ 09:00  Mean cell Bgnxhheuxp36.0  Mean cell Hemoglobin Conc31.8  Mean cell Volum 84.7  Platelet count-Automate 434  RBC Count 3.67  Red Cell Distrib Width13.4  WBC Count8.83  % Albumin, Urine--  Hematocrit 31.1  Hemoglobin 9.9  CBC  Date: 09-02-22 @ 17:41  Mean cell Sjerzommed86.0  Mean cell Hemoglobin Conc31.8  Mean cell Volum 84.7  Platelet count-Automate 440  RBC Count 3.67  Red Cell Distrib Width13.2  WBC Count9.47  % Albumin, Urine--  Hematocrit 31.1  Hemoglobin 9.9  CBC  Date: 09-01-22 @ 06:43  Mean cell Pkkziijnjl47.3  Mean cell Hemoglobin Conc31.5  Mean cell Volum 83.4  Platelet count-Automate 468  RBC Count 4.22  Red Cell Distrib Width13.3  WBC Count8.01  % Albumin, Urine--  Hematocrit 35.2  Hemoglobin 11.1          BMP  09-03-22 @ 09:00  Blood Gas Arterial-Calcium,Ionized--  Blood Urea Nitrogen, Serum 22 mg/dL<H> [10 - 20]  Carbon Dioxide, Serum25 mmol/L [17 - 32]  Chloride, Serum98 mmol/L [98 - 110]  Creatinie, Serum0.7 mg/dL [0.7 - 1.5]  Glucose, Vesgi925 mg/dL<H> [70 - 99]  Potassium, Serum4.6 mmol/L [3.5 - 5.0]  Sodium, Serum 129 mmol/L<L> [135 - 146]  [facility]  09-02-22 @ 17:41  Blood Gas Arterial-Calcium,Ionized--  Blood Urea Nitrogen, Serum 23 mg/dL<H> [10 - 20]  Carbon Dioxide, Serum23 mmol/L [17 - 32]  Chloride, Dckpb173 mmol/L [98 - 110]  Creatinie, Serum0.7 mg/dL [0.7 - 1.5]  Glucose, Serum93 mg/dL [70 - 99]  Potassium, Serum4.8 mmol/L [3.5 - 5.0]  Sodium, Serum 134 mmol/L<L> [135 - 146]  [facility]  09-01-22 @ 06:43  Blood Gas Arterial-Calcium,Ionized--  Blood Urea Nitrogen, Serum 16 mg/dL [10 - 20]  Carbon Dioxide, Serum23 mmol/L [17 - 32]  Chloride, Wkjjs934 mmol/L [98 - 110]  Creatinie, Serum0.7 mg/dL [0.7 - 1.5]  Glucose, Fcqln308 mg/dL<H> [70 - 99]  Potassium, Serum4.8 mmol/L [3.5 - 5.0]  Sodium, Serum 136 mmol/L [135 - 146]          Microbiology:    Culture - Blood (collected 09-01-22 @ 06:43)  Source: .Blood None  Preliminary Report (09-02-22 @ 12:01):    No growth to date.    Culture - Urine (collected 08-28-22 @ 18:36)  Source: Clean Catch Clean Catch (Midstream)  Final Report (08-29-22 @ 23:01):    <10,000 CFU/mL Normal Urogenital Mely      Medications:  acetaminophen     Tablet .. 650 milliGRAM(s) Oral every 6 hours PRN  atorvastatin 20 milliGRAM(s) Oral at bedtime  carbidopa/levodopa  25/250 1 Tablet(s) Oral four times a day  carbidopa/levodopa CR 25/100 1.5 Tablet(s) Oral <User Schedule>  enoxaparin Injectable 40 milliGRAM(s) SubCutaneous every 24 hours  ferrous    sulfate 325 milliGRAM(s) Oral daily  ibuprofen  Tablet. 400 milliGRAM(s) Oral every 6 hours PRN  metoprolol succinate ER 50 milliGRAM(s) Oral daily  pantoprazole    Tablet 40 milliGRAM(s) Oral before breakfast  polyethylene glycol 3350 17 Gram(s) Oral two times a day  selegiline Oral Tab/Cap 5 milliGRAM(s) Oral <User Schedule>  senna 2 Tablet(s) Oral at bedtime  traMADol 25 milliGRAM(s) Oral every 6 hours PRN        Assessment and Plan:  Patient is a 78 y/o  Male  w/ PMHx of Parkinson's, back surgery (2019), cardiac stent placement (2005) presents for evaluation of multiple falls w/ back pain.    # Back pain S/P Fall  # Acute bilateral sacral/acute and chronic thoracic/lumbar compression fractures   # Acute Left femoral intertrochanteric  fracture  - CT showed Severe T10 and moderate to severe at T5 vertebral body compression fracture of indeterminate age. Severe compression deformity of L2 vertebral body of indeterminate age.  - CT head does not show acute bleed.   - Neurosurgery following  - rec.  No acute  intervention at present time  - MRI T/L spine- multiple compression fx concern for MM, acute bilateral sacral fractures, concern for myositis     - SPEP - negative for abn. paraproteins   - Neurosurgery spoke with pt's son Dr. Gomez - a orthopedic doc - for  now no plans for neurosurgery at [facility]  - Abdominal binder / TLSO /- Pain control /- Fall precautions / - PT/OT with brace as tolerated   - s/p  MR of Pelvis- acute left intertrochanteric fracture, sacral fractures with soft tissue edema  -9/2- as per  Orthopedic Surgery specialist f/up  for acute left femoral fracture- no inpatient surgical interventions, Patient's Son Dr. Thelma Sams ( Orthopedic Surgeon at hospitals) planning to transfer patient to his hospital for Left hip repair, will continue NWB to left lower ext.       # Mild Hyponatremia due to pain, po intake, continue close outpatient monitoring of BMP    # Urinary incontinency - improving after large bowel movement   - Likely Overflow based on hx  - Kidney bladder US with PVR      # Parkinson's disease  - C/W home parkinsons medication    # HTN- improved b/p post  d/c norvasc, continue Metoprolol ER 50 mg po once daily      # CAD S/P PCI  - C/W ASA  - Lipitor 20 mg PO QD  - Metoprolol succinate 50 mg PO QD    # Anemia with iron def- started on daily iron tx.      # Elevated Alk Phos  - Likely 2/2 to bone etiology  - GGT wnl    # Constipation  - Miralax BID,  Senna qhs     DVT Prophylaxis: enoxaparin Injectable    -Code Status: Full    Contact info: [phone] patient's wife Mrs. Thelma Morel   [phone] patient's son Dr. Latrell Renee    #Progress Note Handoff: as per patient's son request , patient to be transferred to S facility for left hip surgical repair.   Family discussion: transfer plan was d/w patient's wife and patient's son  Disposition: STR once bed is available     Total time spent to complete patient's bedside assessment, review medical chart, discuss medical plan of care with covering medical team was more than 35 minutes .

## 2022-09-04 VITALS
HEART RATE: 81 BPM | RESPIRATION RATE: 18 BRPM | TEMPERATURE: 98 F | DIASTOLIC BLOOD PRESSURE: 60 MMHG | SYSTOLIC BLOOD PRESSURE: 133 MMHG

## 2022-09-04 LAB
ALBUMIN SERPL ELPH-MCNC: 3.7 G/DL — SIGNIFICANT CHANGE UP (ref 3.5–5.2)
ALP SERPL-CCNC: 147 U/L — HIGH (ref 30–115)
ALT FLD-CCNC: <5 U/L — SIGNIFICANT CHANGE UP (ref 0–41)
ANION GAP SERPL CALC-SCNC: 12 MMOL/L — SIGNIFICANT CHANGE UP (ref 7–14)
AST SERPL-CCNC: 14 U/L — SIGNIFICANT CHANGE UP (ref 0–41)
BASOPHILS # BLD AUTO: 0.08 K/UL — SIGNIFICANT CHANGE UP (ref 0–0.2)
BASOPHILS NFR BLD AUTO: 0.8 % — SIGNIFICANT CHANGE UP (ref 0–1)
BILIRUB SERPL-MCNC: 0.2 MG/DL — SIGNIFICANT CHANGE UP (ref 0.2–1.2)
BUN SERPL-MCNC: 21 MG/DL — HIGH (ref 10–20)
CALCIUM SERPL-MCNC: 9.5 MG/DL — SIGNIFICANT CHANGE UP (ref 8.5–10.1)
CHLORIDE SERPL-SCNC: 101 MMOL/L — SIGNIFICANT CHANGE UP (ref 98–110)
CO2 SERPL-SCNC: 23 MMOL/L — SIGNIFICANT CHANGE UP (ref 17–32)
CREAT SERPL-MCNC: 0.7 MG/DL — SIGNIFICANT CHANGE UP (ref 0.7–1.5)
EGFR: 94 ML/MIN/1.73M2 — SIGNIFICANT CHANGE UP
EOSINOPHIL # BLD AUTO: 0 K/UL — SIGNIFICANT CHANGE UP (ref 0–0.7)
EOSINOPHIL NFR BLD AUTO: 0 % — SIGNIFICANT CHANGE UP (ref 0–8)
GLUCOSE SERPL-MCNC: 103 MG/DL — HIGH (ref 70–99)
HCT VFR BLD CALC: 32.6 % — LOW (ref 42–52)
HGB BLD-MCNC: 10.5 G/DL — LOW (ref 14–18)
IMM GRANULOCYTES NFR BLD AUTO: 2 % — HIGH (ref 0.1–0.3)
LYMPHOCYTES # BLD AUTO: 1.75 K/UL — SIGNIFICANT CHANGE UP (ref 1.2–3.4)
LYMPHOCYTES # BLD AUTO: 17.3 % — LOW (ref 20.5–51.1)
MAGNESIUM SERPL-MCNC: 2.1 MG/DL — SIGNIFICANT CHANGE UP (ref 1.8–2.4)
MCHC RBC-ENTMCNC: 27.2 PG — SIGNIFICANT CHANGE UP (ref 27–31)
MCHC RBC-ENTMCNC: 32.2 G/DL — SIGNIFICANT CHANGE UP (ref 32–37)
MCV RBC AUTO: 84.5 FL — SIGNIFICANT CHANGE UP (ref 80–94)
MONOCYTES # BLD AUTO: 0.91 K/UL — HIGH (ref 0.1–0.6)
MONOCYTES NFR BLD AUTO: 9 % — SIGNIFICANT CHANGE UP (ref 1.7–9.3)
NEUTROPHILS # BLD AUTO: 7.16 K/UL — HIGH (ref 1.4–6.5)
NEUTROPHILS NFR BLD AUTO: 70.9 % — SIGNIFICANT CHANGE UP (ref 42.2–75.2)
NRBC # BLD: 0 /100 WBCS — SIGNIFICANT CHANGE UP (ref 0–0)
PLATELET # BLD AUTO: 499 K/UL — HIGH (ref 130–400)
POTASSIUM SERPL-MCNC: 5 MMOL/L — SIGNIFICANT CHANGE UP (ref 3.5–5)
POTASSIUM SERPL-SCNC: 5 MMOL/L — SIGNIFICANT CHANGE UP (ref 3.5–5)
PROT SERPL-MCNC: 6.9 G/DL — SIGNIFICANT CHANGE UP (ref 6–8)
RBC # BLD: 3.86 M/UL — LOW (ref 4.7–6.1)
RBC # FLD: 13.8 % — SIGNIFICANT CHANGE UP (ref 11.5–14.5)
SODIUM SERPL-SCNC: 136 MMOL/L — SIGNIFICANT CHANGE UP (ref 135–146)
WBC # BLD: 10.1 K/UL — SIGNIFICANT CHANGE UP (ref 4.8–10.8)
WBC # FLD AUTO: 10.1 K/UL — SIGNIFICANT CHANGE UP (ref 4.8–10.8)

## 2022-09-04 PROCEDURE — 99239 HOSP IP/OBS DSCHRG MGMT >30: CPT

## 2022-09-04 RX ORDER — AMLODIPINE BESYLATE 2.5 MG/1
1 TABLET ORAL
Qty: 0 | Refills: 0 | DISCHARGE

## 2022-09-04 RX ADMIN — CARBIDOPA AND LEVODOPA 1.5 TABLET(S): 25; 100 TABLET ORAL at 05:33

## 2022-09-04 RX ADMIN — Medication 325 MILLIGRAM(S): at 11:15

## 2022-09-04 RX ADMIN — Medication 650 MILLIGRAM(S): at 14:12

## 2022-09-04 RX ADMIN — CARBIDOPA AND LEVODOPA 1.5 TABLET(S): 25; 100 TABLET ORAL at 12:57

## 2022-09-04 RX ADMIN — PANTOPRAZOLE SODIUM 40 MILLIGRAM(S): 20 TABLET, DELAYED RELEASE ORAL at 05:33

## 2022-09-04 RX ADMIN — CARBIDOPA AND LEVODOPA 1 TABLET(S): 25; 100 TABLET ORAL at 11:15

## 2022-09-04 RX ADMIN — CARBIDOPA AND LEVODOPA 1 TABLET(S): 25; 100 TABLET ORAL at 05:34

## 2022-09-04 RX ADMIN — Medication 400 MILLIGRAM(S): at 14:13

## 2022-09-04 RX ADMIN — TRAMADOL HYDROCHLORIDE 25 MILLIGRAM(S): 50 TABLET ORAL at 10:17

## 2022-09-04 RX ADMIN — SELEGILINE HYDROCHLORIDE 5 MILLIGRAM(S): 1.25 TABLET, ORALLY DISINTEGRATING ORAL at 05:33

## 2022-09-04 RX ADMIN — Medication 50 MILLIGRAM(S): at 05:33

## 2022-09-04 NOTE — PROGRESS NOTE ADULT - NUTRITIONAL ASSESSMENT
This patient has been assessed with a concern for Malnutrition and has been determined to have a diagnosis/diagnoses of Underweight (BMI < 19).    This patient is being managed with:   Diet DASH/TLC-  Sodium & Cholesterol Restricted  Supplement Feeding Modality:  Oral  Ensure Enlive Cans or Servings Per Day:  3       Frequency:  Daily  Entered: Aug 30 2022  3:20PM    
This patient has been assessed with a concern for Malnutrition and has been determined to have a diagnosis/diagnoses of Underweight (BMI < 19).    This patient is being managed with:   Diet NPO after Midnight-     NPO Start Date: 30-Aug-2022   NPO Start Time: 23:59  Except Medications  Entered: Aug 30 2022  6:22PM    Diet DASH/TLC-  Sodium & Cholesterol Restricted  Supplement Feeding Modality:  Oral  Ensure Enlive Cans or Servings Per Day:  3       Frequency:  Daily  Entered: Aug 30 2022  3:20PM    
This patient has been assessed with a concern for Malnutrition and has been determined to have a diagnosis/diagnoses of Underweight (BMI < 19).    This patient is being managed with:   Diet DASH/TLC-  Sodium & Cholesterol Restricted  Supplement Feeding Modality:  Oral  Ensure Enlive Cans or Servings Per Day:  3       Frequency:  Daily  Entered: Aug 30 2022  3:20PM    
This patient has been assessed with a concern for Malnutrition and has been determined to have a diagnosis/diagnoses of Underweight (BMI < 19).    This patient is being managed with:   Diet DASH/TLC-  Sodium & Cholesterol Restricted  Supplement Feeding Modality:  Oral  Ensure Enlive Cans or Servings Per Day:  3       Frequency:  Daily  Entered: Aug 30 2022  3:20PM

## 2022-09-04 NOTE — DISCHARGE NOTE NURSING/CASE MANAGEMENT/SOCIAL WORK - NSDCPEFALRISK_GEN_ALL_CORE
For information on Fall & Injury Prevention, visit: [email or web address] OR  [email or web address] OR  https://www.cdc.gov/steadi/patient.html

## 2022-09-04 NOTE — PROGRESS NOTE ADULT - SUBJECTIVE AND OBJECTIVE BOX
LALO GOMEZ  [facility] 027 C ([facility])      Patient was evaluated and examined  by bedside, c/o lower back/buttocks pain, controlled with current pain meds regimen      REVIEW OF SYSTEMS:  please see pertinent positives mentioned above, all other 12 ROS negative      T(C): , Max: 36.7 (09-03-22 @ 16:00)  HR: 95 (09-04-22 @ 08:00)  BP: 152/82 (09-04-22 @ 08:00)  RR: 18 (09-04-22 @ 08:00)  SpO2: 98% (09-03-22 @ 16:00)  CAPILLARY BLOOD GLUCOSE          PHYSICAL EXAM:  General: NAD, AAOX3, patient is sitting  in a chair, cachectic   HEENT: AT, NC, Supple, NO JVD, NO CB  Lungs: CTA B/L, no wheezing, no rhonchi  CVS: normal S1, S2, RRR, NO M/G/R  Abdomen: soft, bowel sounds present, non-tender, non-distended  Extremities: no edema, no clubbing, no cyanosis, positive peripheral pulses b/l  Neuro: generalized weakness, sensory intact, occasional hand tremors present, gait not tested   Skin: no rash, no ecchymosis      LAB  CBC  Date: 09-04-22 @ 08:21  Mean cell Kiopcbawzc01.2  Mean cell Hemoglobin Conc32.2  Mean cell Volum 84.5  Platelet count-Automate 499  RBC Count 3.86  Red Cell Distrib Width13.8  WBC Count10.10  % Albumin, Urine--  Hematocrit 32.6  Hemoglobin 10.5  CBC  Date: 09-03-22 @ 09:00  Mean cell Jkuqkcjmtb70.0  Mean cell Hemoglobin Conc31.8  Mean cell Volum 84.7  Platelet count-Automate 434  RBC Count 3.67  Red Cell Distrib Width13.4  WBC Count8.83  % Albumin, Urine--  Hematocrit 31.1  Hemoglobin 9.9  CBC  Date: 09-02-22 @ 17:41  Mean cell Lzupabblik56.0  Mean cell Hemoglobin Conc31.8  Mean cell Volum 84.7  Platelet count-Automate 440  RBC Count 3.67  Red Cell Distrib Width13.2  WBC Count9.47  % Albumin, Urine--  Hematocrit 31.1  Hemoglobin 9.9  CBC  Date: 09-01-22 @ 06:43  Mean cell Htvznddsxv80.3  Mean cell Hemoglobin Conc31.5  Mean cell Volum 83.4  Platelet count-Automate 468  RBC Count 4.22  Red Cell Distrib Width13.3  WBC Count8.01  % Albumin, Urine--  Hematocrit 35.2  Hemoglobin 11.1  CBC  Date: 08-31-22 @ 06:38  Mean cell Ckmfhazlbt54.9  Mean cell Hemoglobin Conc31.8  Mean cell Volum 84.6  Platelet count-Automate 377  RBC Count 4.16  Red Cell Distrib Width13.3  WBC Count5.38  % Albumin, Urine--  Hematocrit 35.2  Hemoglobin 11.2  CBC  Date: 08-30-22 @ 06:48  Mean cell Vooiowuswu18.3  Mean cell Hemoglobin Conc32.6  Mean cell Volum 83.9  Platelet count-Automate 315  RBC Count 3.66  Red Cell Distrib Width13.6  WBC Count6.92  % Albumin, Urine--  Hematocrit 30.7  Hemoglobin 10.0  CBC  Date: 08-29-22 @ 07:28  Mean cell Goaxxcklsi61.5  Mean cell Hemoglobin Conc32.1  Mean cell Volum 82.6  Platelet count-Automate 345  RBC Count 3.73  Red Cell Distrib Width13.5  WBC Count7.17  % Albumin, Urine--  Hematocrit 30.8  Hemoglobin 9.9  CBC  Date: 08-28-22 @ 16:14  Mean cell Lrcbdxcwgr08.9  Mean cell Hemoglobin Conc32.1  Mean cell Volum 83.7  Platelet count-Automate 321  RBC Count 3.68  Red Cell Distrib Width13.2  WBC Count8.63  % Albumin, Urine--  Hematocrit 30.8  Hemoglobin 9.9    [facility]  09-04-22 @ 08:21  Blood Gas Arterial-Calcium,Ionized--  Blood Urea Nitrogen, Serum 21 mg/dL<H> [10 - 20]  Carbon Dioxide, Serum23 mmol/L [17 - 32]  Chloride, Cgxby465 mmol/L [98 - 110]  Creatinie, Serum0.7 mg/dL [0.7 - 1.5]  Glucose, Jtxfk427 mg/dL<H> [70 - 99]  Potassium, Serum5.0 mmol/L [3.5 - 5.0]  Sodium, Serum 136 mmol/L [135 - 146]  [facility]  09-03-22 @ 18:02  Blood Gas Arterial-Calcium,Ionized--  Blood Urea Nitrogen, Serum 26 mg/dL<H> [10 - 20]  Carbon Dioxide, Serum23 mmol/L [17 - 32]  Chloride, Areie279 mmol/L [98 - 110]  Creatinie, Serum0.6 mg/dL<L> [0.7 - 1.5]  Glucose, Skxdx566 mg/dL<H> [70 - 99]  Potassium, Serum4.9 mmol/L [3.5 - 5.0]  Sodium, Serum 134 mmol/L<L> [135 - 146]  [facility]  09-03-22 @ 09:00  Blood Gas Arterial-Calcium,Ionized--  Blood Urea Nitrogen, Serum 22 mg/dL<H> [10 - 20]  Carbon Dioxide, Serum25 mmol/L [17 - 32]  Chloride, Serum98 mmol/L [98 - 110]  Creatinie, Serum0.7 mg/dL [0.7 - 1.5]  Glucose, Hbsau571 mg/dL<H> [70 - 99]  Potassium, Serum4.6 mmol/L [3.5 - 5.0]  Sodium, Serum 129 mmol/L<L> [135 - 146]  [facility]  09-02-22 @ 17:41  Blood Gas Arterial-Calcium,Ionized--  Blood Urea Nitrogen, Serum 23 mg/dL<H> [10 - 20]  Carbon Dioxide, Serum23 mmol/L [17 - 32]  Chloride, Amcuv499 mmol/L [98 - 110]  Creatinie, Serum0.7 mg/dL [0.7 - 1.5]  Glucose, Serum93 mg/dL [70 - 99]  Potassium, Serum4.8 mmol/L [3.5 - 5.0]  Sodium, Serum 134 mmol/L<L> [135 - 146]  [facility]  09-01-22 @ 06:43  Blood Gas Arterial-Calcium,Ionized--  Blood Urea Nitrogen, Serum 16 mg/dL [10 - 20]  Carbon Dioxide, Serum23 mmol/L [17 - 32]  Chloride, Xwnrz237 mmol/L [98 - 110]  Creatinie, Serum0.7 mg/dL [0.7 - 1.5]  Glucose, Xwnsw107 mg/dL<H> [70 - 99]  Potassium, Serum4.8 mmol/L [3.5 - 5.0]  Sodium, Serum 136 mmol/L [135 - 146]  [facility]  08-31-22 @ 06:38  Blood Gas Arterial-Calcium,Ionized--  Blood Urea Nitrogen, Serum 17 mg/dL [10 - 20]  Carbon Dioxide, Serum18 mmol/L [17 - 32]  Chloride, Bxipy646 mmol/L [98 - 110]  Creatinie, Serum0.7 mg/dL [0.7 - 1.5]  Glucose, Ljibr718 mg/dL<H> [70 - 99]  Potassium, Serum4.6 mmol/L [3.5 - 5.0] [Slighty Hemolyzed use with Caution]  Sodium, Serum 137 mmol/L [135 - 146]              Microbiology:    Culture - Blood (collected 09-01-22 @ 06:43)  Source: .Blood None  Preliminary Report (09-02-22 @ 12:01):    No growth to date.    Culture - Urine (collected 08-28-22 @ 18:36)  Source: Clean Catch Clean Catch (Midstream)  Final Report (08-29-22 @ 23:01):    <10,000 CFU/mL Normal Urogenital Mely        Medications:  acetaminophen     Tablet .. 650 milliGRAM(s) Oral every 6 hours PRN  atorvastatin 20 milliGRAM(s) Oral at bedtime  carbidopa/levodopa  25/250 1 Tablet(s) Oral four times a day  carbidopa/levodopa CR 25/100 1.5 Tablet(s) Oral <User Schedule>  enoxaparin Injectable 40 milliGRAM(s) SubCutaneous every 24 hours  ferrous    sulfate 325 milliGRAM(s) Oral daily  ibuprofen  Tablet. 400 milliGRAM(s) Oral every 6 hours PRN  metoprolol succinate ER 50 milliGRAM(s) Oral daily  pantoprazole    Tablet 40 milliGRAM(s) Oral before breakfast  polyethylene glycol 3350 17 Gram(s) Oral two times a day  selegiline Oral Tab/Cap 5 milliGRAM(s) Oral <User Schedule>  senna 2 Tablet(s) Oral at bedtime  traMADol 25 milliGRAM(s) Oral every 6 hours PRN        Assessment and Plan:  Patient is a 78 y/o  Male  w/ PMHx of Parkinson's, back surgery (2019), cardiac stent placement (2005) presents for evaluation of multiple falls w/ back pain.    # Back pain S/P Fall  # Acute bilateral sacral/acute and chronic thoracic/lumbar compression fractures   # Acute Left femoral intertrochanteric  fracture  - CT showed Severe T10 and moderate to severe at T5 vertebral body compression fracture of indeterminate age. Severe compression deformity of L2 vertebral body of indeterminate age.  - CT head does not show acute bleed.   - Neurosurgery following  - rec.  No acute  intervention at present time  - MRI T/L spine- multiple compression fx concern for MM, acute bilateral sacral fractures, concern for myositis     - SPEP - negative for abn. paraproteins   - Neurosurgery spoke with pt's son Dr. Gomez -  for  now no plans for neurosurgery at [facility]  - Abdominal binder / TLSO /- Pain control /- Fall precautions / - PT/OT with brace as tolerated   - s/p  MR of Pelvis- acute left intertrochanteric fracture, sacral fractures with soft tissue edema  -9/2- as per  Orthopedic Surgery specialist f/up  for acute left femoral fracture- no inpatient surgical interventions, Patient's Son Dr. Thelma Sams ( Orthopedic Surgeon at [facility]) planning to transfer patient to his hospital for Left hip repair, will continue NWB to left lower ext.       # Mild Hyponatremia due to pain, po intake, resolved     # Urinary incontinency - improving after large bowel movement   - Likely Overflow based on hx  - Kidney bladder US with PVR      # Parkinson's disease  - C/W home parkinsons medication    # HTN- improved b/p post  d/c norvasc, continue Metoprolol ER 50 mg po once daily      # CAD S/P PCI  - C/W ASA  - Lipitor 20 mg PO QD  - Metoprolol succinate 50 mg PO QD    # Anemia with iron def- started on daily iron tx.      # Elevated Alk Phos  - Likely 2/2 to bone etiology  - GGT wnl    # Constipation  - Miralax BID,  Senna qhs     DVT Prophylaxis: enoxaparin Injectable    -Code Status: Full    Contact info: [phone] patient's wife Mrs. Thelma Morel   [phone] patient's son Dr. Latrell Renee    #Progress Note Handoff: as per patient's son request , patient to be transferred to [facility] facility for left hip surgical repair.   Family discussion: transfer plan was d/w patient's wife and patient's son  Disposition: d/c to [facility] today     Total time spent to complete patient's bedside assessment, review medical chart, discuss medical plan of care with covering medical team was more than 35 minutes .

## 2022-09-04 NOTE — PROGRESS NOTE ADULT - SUBJECTIVE AND OBJECTIVE BOX
HPI:  77 y M with a PMHx of Parkinson's disease, Hx of back surgery (2019), H/O cardiac stent placement 2005, H/O esophogeal ulcers HTN, Osteoporosis. The patient came in for evaluation of multiple falls and back pain.  The patient is accompanied by his wife stating he had two falls 2 weeks ago, one was down several steps and the other he fell and struck his right knee. The patient had a dizzy and lightheaded sensation prior to one of the falls, but there was no LOC, urinary/fecal incontinence or post episode confusion. The second one was purely mechanical when he slipped going up a flight of stairs. The patient has been participating in physical therapy for his on-going back issues and worsening Parkinson's disease, noting that his back pain has progressively worsened and has associated shooting pain down both legs. The patient came to ED today as his wife states its been very difficult for her to take care of him and he also started to develop urinary incontinence. The urinary incontinence started a few days ago. There is no overwhelming urge to urinate and patient looses urine all of a sudden without realizing it. [place], Pan scan was performed demonstrating right 8th, left 9th rib fractures, acute fracture of right and left aspect of sacrum and several age indeterminate Thoracic compression fractures.    The patient was evaluated by trauma and neurosurgery teams and will be admitted to medicine for W/U of recurrent falls.    (28 Aug 2022 22:18)    Currently admitted to medicine with the primary diagnosis of Fall       Today is hospital day 7d.     INTERVAL HPI / OVERNIGHT EVENTS:  Patient was examined and seen at bedside. This morning he is resting comfortably in bed and reports no new issues or overnight events.     ROS: Otherwise unremarkable     PAST MEDICAL & SURGICAL HISTORY  Parkinsons    History of back surgery    H/O heart artery stent      ALLERGIES  No Known Allergies    MEDICATIONS  STANDING MEDICATIONS  atorvastatin 20 milliGRAM(s) Oral at bedtime  carbidopa/levodopa  25/250 1 Tablet(s) Oral four times a day  carbidopa/levodopa CR 25/100 1.5 Tablet(s) Oral <User Schedule>  enoxaparin Injectable 40 milliGRAM(s) SubCutaneous every 24 hours  ferrous    sulfate 325 milliGRAM(s) Oral daily  metoprolol succinate ER 50 milliGRAM(s) Oral daily  pantoprazole    Tablet 40 milliGRAM(s) Oral before breakfast  polyethylene glycol 3350 17 Gram(s) Oral two times a day  selegiline Oral Tab/Cap 5 milliGRAM(s) Oral <User Schedule>  senna 2 Tablet(s) Oral at bedtime    PRN MEDICATIONS  acetaminophen     Tablet .. 650 milliGRAM(s) Oral every 6 hours PRN  ibuprofen  Tablet. 400 milliGRAM(s) Oral every 6 hours PRN  traMADol 25 milliGRAM(s) Oral every 6 hours PRN    VITALS:  T(F): 96.2  HR: 95  BP: 152/82  RR: 18  SpO2: 98%    PHYSICAL EXAM  GEN: NAD, Resting comfortably in bed  PULM: Clear to auscultation bilaterally, No wheezes  CVS: Regular rate and rhythm, S1-S2, no murmurs  ABD: Soft, non-tender, non-distended, no guarding  EXT: No edema  NEURO: A&Ox3, no focal deficits      LABS                        10.5   10.10 )-----------( 499      ( 04 Sep 2022 08:21 )             32.6     09-03    134<L>  |  101  |  26<H>  ----------------------------<  104<H>  4.9   |  23  |  0.6<L>    Ca    9.3      03 Sep 2022 18:02  Mg     2.1     09-03    TPro  6.6  /  Alb  3.6  /  TBili  0.2  /  DBili  x   /  AST  14  /  ALT  <5  /  AlkPhos  137<H>  09-03

## 2022-09-04 NOTE — DISCHARGE NOTE NURSING/CASE MANAGEMENT/SOCIAL WORK - PATIENT PORTAL LINK FT
You can access the FollowMyHealth Patient Portal offered by [facility] by registering at the following website: [email or web address]. By joining [facility]’s FollowMyHealth portal, you will also be able to view your health information using other applications (apps) compatible with our system.

## 2022-09-04 NOTE — PROGRESS NOTE ADULT - PROVIDER SPECIALTY LIST ADULT
Hospitalist
Internal Medicine
Internal Medicine
Neurosurgery
Hospitalist
Internal Medicine

## 2022-09-06 LAB
CULTURE RESULTS: SIGNIFICANT CHANGE UP
INTERPRETATION 24H UR IFE-IMP: SIGNIFICANT CHANGE UP
INTERPRETATION 24H UR IFE-IMP: SIGNIFICANT CHANGE UP
SPECIMEN SOURCE: SIGNIFICANT CHANGE UP

## 2022-09-08 LAB — ALDOLASE SERPL-CCNC: 7.7 U/L — SIGNIFICANT CHANGE UP (ref 3.3–10.3)

## 2022-09-09 LAB
% GAMMA, URINE: 15.4 % — SIGNIFICANT CHANGE UP
ALBUMIN 24H MFR UR ELPH: 19.3 % — SIGNIFICANT CHANGE UP
ALPHA1 GLOB 24H MFR UR ELPH: 32.2 % — SIGNIFICANT CHANGE UP
ALPHA2 GLOB 24H MFR UR ELPH: 18.8 % — SIGNIFICANT CHANGE UP
B-GLOBULIN 24H MFR UR ELPH: 14.3 % — SIGNIFICANT CHANGE UP
INTERPRETATION 24H UR IFE-IMP: SIGNIFICANT CHANGE UP
INTERPRETATION 24H UR IFE-IMP: SIGNIFICANT CHANGE UP
M PROTEIN 24H UR ELPH-MRATE: SIGNIFICANT CHANGE UP
PROT ?TM UR-MCNC: 14 MG/DL — HIGH (ref 0–12)
PROT PATTERN 24H UR ELPH-IMP: SIGNIFICANT CHANGE UP
TOTAL VOLUME - 24 HOUR: SIGNIFICANT CHANGE UP ML
URINE CREATININE CALCULATION: SIGNIFICANT CHANGE UP G/24 H (ref 1–2)

## 2022-09-23 ENCOUNTER — EMERGENCY (EMERGENCY)
Facility: HOSPITAL | Age: 77
LOS: 0 days | Discharge: ANOTHER HOSPITAL | End: 2022-09-23
Attending: EMERGENCY MEDICINE | Admitting: EMERGENCY MEDICINE

## 2022-09-23 VITALS
SYSTOLIC BLOOD PRESSURE: 195 MMHG | HEART RATE: 105 BPM | OXYGEN SATURATION: 98 % | DIASTOLIC BLOOD PRESSURE: 100 MMHG | RESPIRATION RATE: 18 BRPM | TEMPERATURE: 99 F

## 2022-09-23 VITALS
OXYGEN SATURATION: 97 % | RESPIRATION RATE: 20 BRPM | DIASTOLIC BLOOD PRESSURE: 83 MMHG | TEMPERATURE: 98 F | HEIGHT: 68 IN | HEART RATE: 92 BPM | SYSTOLIC BLOOD PRESSURE: 188 MMHG

## 2022-09-23 DIAGNOSIS — Z20.822 CONTACT WITH AND (SUSPECTED) EXPOSURE TO COVID-19: ICD-10-CM

## 2022-09-23 DIAGNOSIS — Z95.5 PRESENCE OF CORONARY ANGIOPLASTY IMPLANT AND GRAFT: ICD-10-CM

## 2022-09-23 DIAGNOSIS — G20 PARKINSON'S DISEASE: ICD-10-CM
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
## 2022-09-23 DIAGNOSIS — S72.001A FRACTURE OF UNSPECIFIED PART OF NECK OF RIGHT FEMUR, INITIAL ENCOUNTER FOR CLOSED FRACTURE: ICD-10-CM

## 2022-09-23 DIAGNOSIS — I25.10 ATHEROSCLEROTIC HEART DISEASE OF NATIVE CORONARY ARTERY WITHOUT ANGINA PECTORIS: ICD-10-CM

## 2022-09-23 DIAGNOSIS — M79.604 PAIN IN RIGHT LEG: ICD-10-CM
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
## 2022-09-23 DIAGNOSIS — Z79.82 LONG TERM (CURRENT) USE OF ASPIRIN: ICD-10-CM

## 2022-09-23 PROBLEM — Z98.890 OTHER SPECIFIED POSTPROCEDURAL STATES: Chronic | Status: ACTIVE | Noted: 2022-08-28

## 2022-09-23 LAB
ALBUMIN SERPL ELPH-MCNC: 3.7 G/DL — SIGNIFICANT CHANGE UP (ref 3.5–5.2)
ALP SERPL-CCNC: 132 U/L — HIGH (ref 30–115)
ALT FLD-CCNC: <5 U/L — SIGNIFICANT CHANGE UP (ref 0–41)
ANION GAP SERPL CALC-SCNC: 11 MMOL/L — SIGNIFICANT CHANGE UP (ref 7–14)
APTT BLD: 28.8 SEC — SIGNIFICANT CHANGE UP (ref 27–39.2)
AST SERPL-CCNC: 20 U/L — SIGNIFICANT CHANGE UP (ref 0–41)
BASOPHILS # BLD AUTO: 0.05 K/UL — SIGNIFICANT CHANGE UP (ref 0–0.2)
BASOPHILS NFR BLD AUTO: 0.5 % — SIGNIFICANT CHANGE UP (ref 0–1)
BILIRUB SERPL-MCNC: 0.3 MG/DL — SIGNIFICANT CHANGE UP (ref 0.2–1.2)
BLD GP AB SCN SERPL QL: SIGNIFICANT CHANGE UP
BUN SERPL-MCNC: 14 MG/DL — SIGNIFICANT CHANGE UP (ref 10–20)
CALCIUM SERPL-MCNC: 9.8 MG/DL — SIGNIFICANT CHANGE UP (ref 8.4–10.5)
CHLORIDE SERPL-SCNC: 101 MMOL/L — SIGNIFICANT CHANGE UP (ref 98–110)
CO2 SERPL-SCNC: 22 MMOL/L — SIGNIFICANT CHANGE UP (ref 17–32)
CREAT SERPL-MCNC: 0.7 MG/DL — SIGNIFICANT CHANGE UP (ref 0.7–1.5)
EGFR: 94 ML/MIN/1.73M2 — SIGNIFICANT CHANGE UP
EOSINOPHIL # BLD AUTO: 0 K/UL — SIGNIFICANT CHANGE UP (ref 0–0.7)
EOSINOPHIL NFR BLD AUTO: 0 % — SIGNIFICANT CHANGE UP (ref 0–8)
GLUCOSE SERPL-MCNC: 132 MG/DL — HIGH (ref 70–99)
HCT VFR BLD CALC: 33.3 % — LOW (ref 42–52)
HGB BLD-MCNC: 10.7 G/DL — LOW (ref 14–18)
IMM GRANULOCYTES NFR BLD AUTO: 1 % — HIGH (ref 0.1–0.3)
INR BLD: 1.07 RATIO — SIGNIFICANT CHANGE UP (ref 0.65–1.3)
LYMPHOCYTES # BLD AUTO: 1.15 K/UL — LOW (ref 1.2–3.4)
LYMPHOCYTES # BLD AUTO: 10.7 % — LOW (ref 20.5–51.1)
MCHC RBC-ENTMCNC: 27.1 PG — SIGNIFICANT CHANGE UP (ref 27–31)
MCHC RBC-ENTMCNC: 32.1 G/DL — SIGNIFICANT CHANGE UP (ref 32–37)
MCV RBC AUTO: 84.3 FL — SIGNIFICANT CHANGE UP (ref 80–94)
MONOCYTES # BLD AUTO: 0.93 K/UL — HIGH (ref 0.1–0.6)
MONOCYTES NFR BLD AUTO: 8.7 % — SIGNIFICANT CHANGE UP (ref 1.7–9.3)
NEUTROPHILS # BLD AUTO: 8.49 K/UL — HIGH (ref 1.4–6.5)
NEUTROPHILS NFR BLD AUTO: 79.1 % — HIGH (ref 42.2–75.2)
NRBC # BLD: 0 /100 WBCS — SIGNIFICANT CHANGE UP (ref 0–0)
PLATELET # BLD AUTO: 374 K/UL — SIGNIFICANT CHANGE UP (ref 130–400)
POTASSIUM SERPL-MCNC: 5 MMOL/L — SIGNIFICANT CHANGE UP (ref 3.5–5)
POTASSIUM SERPL-SCNC: 5 MMOL/L — SIGNIFICANT CHANGE UP (ref 3.5–5)
PROT SERPL-MCNC: 7.1 G/DL — SIGNIFICANT CHANGE UP (ref 6–8)
PROTHROM AB SERPL-ACNC: 12.3 SEC — SIGNIFICANT CHANGE UP (ref 9.95–12.87)
RBC # BLD: 3.95 M/UL — LOW (ref 4.7–6.1)
RBC # FLD: 14.6 % — HIGH (ref 11.5–14.5)
SARS-COV-2 RNA SPEC QL NAA+PROBE: SIGNIFICANT CHANGE UP
SODIUM SERPL-SCNC: 134 MMOL/L — LOW (ref 135–146)
WBC # BLD: 10.73 K/UL — SIGNIFICANT CHANGE UP (ref 4.8–10.8)
WBC # FLD AUTO: 10.73 K/UL — SIGNIFICANT CHANGE UP (ref 4.8–10.8)

## 2022-09-23 PROCEDURE — 99285 EMERGENCY DEPT VISIT HI MDM: CPT | Mod: FS

## 2022-09-23 PROCEDURE — 73552 X-RAY EXAM OF FEMUR 2/>: CPT | Mod: 26,RT

## 2022-09-23 PROCEDURE — 71045 X-RAY EXAM CHEST 1 VIEW: CPT | Mod: 26

## 2022-09-23 PROCEDURE — 72170 X-RAY EXAM OF PELVIS: CPT | Mod: 26

## 2022-09-23 PROCEDURE — 93010 ELECTROCARDIOGRAM REPORT: CPT

## 2022-09-23 PROCEDURE — 73562 X-RAY EXAM OF KNEE 3: CPT | Mod: 26,RT

## 2022-09-23 RX ORDER — MORPHINE SULFATE 50 MG/1
4 CAPSULE, EXTENDED RELEASE ORAL ONCE
Refills: 0 | Status: DISCONTINUED | OUTPATIENT
Start: 2022-09-23 | End: 2022-09-23

## 2022-09-23 RX ADMIN — MORPHINE SULFATE 4 MILLIGRAM(S): 50 CAPSULE, EXTENDED RELEASE ORAL at 14:43

## 2022-09-23 RX ADMIN — MORPHINE SULFATE 4 MILLIGRAM(S): 50 CAPSULE, EXTENDED RELEASE ORAL at 08:42

## 2022-09-23 NOTE — ED PROVIDER NOTE - PROGRESS NOTE DETAILS
Patient advised of right hip fx. Wife wants patient to be transferred to [facility] as son is an ortho attending there.

## 2022-09-23 NOTE — ED PROVIDER NOTE - CLINICAL SUMMARY MEDICAL DECISION MAKING FREE TEXT BOX
77-year-old male presents emergency department for ground-level fall with inability to move right leg.  Presents shortened and rotated, consistent with clinical hip fracture.  Had screening labs, imaging with identification of intertrochanteric right femoral neck fracture.  Patient son is an orthopedic surgeon at [facility] special surgery and prefers him to be transferred to that facility for patient care and continuity of care as he recently had surgery there as well.  There are no other acute emergent conditions at this time, there is an accepting physician at [facility], will transfer for continuity of care patient request.

## 2022-09-23 NOTE — ED PROVIDER NOTE - ATTENDING APP SHARED VISIT CONTRIBUTION OF CARE
I personally evaluated the patient. I reviewed the Resident´s or Physician Assistant´s note (as assigned above), and agree with the findings and plan except as documented in my note.      77-year-old male presents the emergency department complaining of ground-level fall with inability move right lower extremity.  No other injury.  Takes anticoagulant for known medical conditions, but had no head injury.  Review of systems otherwise unremarkable    Past medical history includes significant disease burden, specifically neurodegenerative disease    GENERAL: Deconditioned male in no distress.   HEENT: EOMI non icteric temporal wasting noted  NECK: FROM  CHEST: normal work of breathing noted. CTA bilateral.   CV: pulses intact S1S2 regular  ABD: soft, non rigid, non distended  EXTR: Right lower extremity shortened and rotated, tender at the right hip point palpation, distally neurovascular intact  NEURO: AAO 3   SKIN: normal no pallor  PSYCH: normal mood & mentation    Impression: Fall, likely right hip fracture    Plan: IV, labs, imaging, supportive care & reevaluation

## 2022-09-23 NOTE — ED ADULT TRIAGE NOTE - TEMPERATURE IN CELSIUS (DEGREES C)
[facility]  Progress Note    Thien Watkins Patient Status:  Inpatient    1959 MRN [id number]   [facility] 4SW-A Attending Marlena Hyman MD   [facility] Day # 9 PCP Loc [place]     Subjective:  Thien Watkins is a(n) 62year old male. 88.7   < >  89.6  90.7  93.7   MCH  28.5   < >  28.6  28.1  28.5   MCHC  32.1   < >  31.9  30.9*  30.4*   RDW  12.2   < >  12.1  12.2  12.5   NEPRELIM  5.56   --    --   6.95  7.84*   WBC  7.1   < >  7.3  8.6  10.1   PLT  309.0   < >  264.0  279.0  342.0 36.5

## 2022-09-23 NOTE — ED ADULT TRIAGE NOTE - CHIEF COMPLAINT QUOTE
BEAR from [facility] home, patient tripped and fell while walking to his dresser, denies hitting his head, denies LOC, +right upper thigh pain Hx frequent falls

## 2022-09-23 NOTE — CHART NOTE - NSCHARTNOTEFT_GEN_A_CORE
Ortho called about R IT fracture.  Imaging reviewed, confirmed right intertrochanteric femur fracture.  ER team called to notify that patient's son  Thelma in an orthopaedic attending at [facility], and patient will be transferred, and orthopaedic services are not needed here at [facility].    Please notify Ortho ASAP if anything changes, as femur fractures, especially in the elderly, should be managed in a timely manner.

## 2022-09-23 NOTE — ED PROVIDER NOTE - NS ED ROS FT
Review of Systems    Constitutional: (-) fever, (-) chills  Eyes/ENT: (-) blurry vision, (-) epistaxis, (-) sore throat  Cardiovascular: (-) chest pain, (-) syncope  Respiratory: (-) cough, (-) shortness of breath  Gastrointestinal: (-) pain, (-) nausea, (-) vomiting, (-) diarrhea  Musculoskeletal: (-) neck pain, (-) back pain, (+) right upper leg pain  Integumentary: (-) rash, (-) edema  Neurological: (-) headache, (-) altered mental status  Psychiatric: (-) hallucinations  Allergic/Immunologic: (-) pruritus

## 2022-09-23 NOTE — ED PROVIDER NOTE - PHYSICAL EXAMINATION
Gen: Alert, NAD, well appearing  Head: NC, AT, PERRL, EOMI, normal lids/conjunctiva  ENT: normal hearing  Neck: +supple, no tenderness/meningismus,  Pulm: Bilateral BS, normal resp effort, no wheeze/stridor/retractions  CV: RRR  Abd: soft, NT/ND  Mskel: Right leg: shortened and externally rotated. +tender to palpation over thigh. Decreased right leg. n/v intact. Non tender and FROM x 3 other extremities. No edema/erythema/cyanosis  Skin: no rash, warm/dry  Neuro: AAOx3, no sensory/motor deficits

## 2022-09-23 NOTE — ED ADULT NURSE NOTE - CHIEF COMPLAINT QUOTE
BEAR from [facility] home, patient tripped and fell while walking to his dresser, denies hitting his head, denies LOC, +right upper thigh pain Hx frequent falls

## 2022-09-23 NOTE — ED ADULT NURSE NOTE - ED STAT RN HANDOFF DETAILS
pt transferred to [facility] , STEPDOWN UNIT , ROOM 543 BED 1. report given to RN , pt stable,  pts pain controlled at this time. transferred via ambulance.

## 2022-09-23 NOTE — ED PROVIDER NOTE - NSPREEXISTRELATION_ED_A_ED_FT
Has known prior hip surgery at [facility]. Son is on staff at [facility].

## 2022-09-23 NOTE — ED ADULT NURSE NOTE - OBJECTIVE STATEMENT
pt tripped and fell in the nursing home, denies hitting his head, no loc. pt with right upper thigh pain.

## 2022-11-28 NOTE — ED PROVIDER NOTE - OBJECTIVE STATEMENT
History from patient and wife  77-year-old male history of left femur fracture, CAD, Parkinson's  complaining of right leg pain.  Patient states he was getting out of bed and slipped fell injuring his right upper leg.  Patient denies any head trauma, neck, chest, back, or abdominal pains/injuries. 36.1

## 2023-02-06 NOTE — PROGRESS NOTE ADULT - ASSESSMENT
77 y M w/ PMHx of Parkinson's, back surgery (2019), cardiac stent placement (2005) presents for evaluation of multiple falls w/ back pain.    # Back pain S/P Fall R/O cauda Equina syndrome  # Acute bilateral sacral fractures   - CT showed Severe T10 and moderate to severe at T5 vertebral body compression fracture of indeterminate age. Severe compression deformity of L2 vertebral body of indeterminate age.  - Neurosurgery following   - No acute neurosurgical intervention   - MRI T/L spine- multiple compression fx concern for MM, acute bilateral sacral fractures, concern for myositis     - UPEP/SPEP ordered, ortho consulted, neuro consulted, Neurosurgery spoke with pt's son Dr. Gomez - a orthopedic doc - now no plans for neurosurgery at [facility]  - Abdominal binder / TLSO   - Pain control   - CT head does not show acute bleed.   - EKG  - Fall precautions   - PT/OT  - Orthostatics    # Urinary incontinency - improving after large bowel movement   - Likely Overflow based on hx  - Kidney bladder US with PVR  - 240 PVR    # Parkinson's disease  - C/W home parkinsons medication    # HTN  - C/W amlodipine 5 mg PO QD      # CAD S/P PCI  - C/W ASA  - Lipitor 20 mg PO QD  - Metoprolol succinate 50 mg PO QD    # Anemia   - Unknown baseline  - send iron studies  - B12, Folate -wnl    # hypoNa -resolved   - Serum and urine osmolarity  - Urine sodium  - Encourage PO intake   - Repeat Na      # Elevated Alk Phos  - Likely 2/2 to bone etiology  - GGT wnl    # Constipation  - Miralax BID  - Senna qhs  - 8/30 - pt had good amount bowel movement    # Misc  - DVT Prophylaxis: enoxaparin Injectable  - GI Prophylaxis: pantoprazole    Tablet 40 milliGRAM(s) Oral before breakfast  - Diet: Diet, DASH/TLC  - Code Status: Full  - Pending: MM work up, sacral fracture ortho plan, pain control       
77 y M w/ PMHx of Parkinson's, back surgery (2019), cardiac stent placement (2005) presents for evaluation of multiple falls w/ back pain.    # Back pain S/P Fall  # Acute bilateral sacral/acute and chronic thoracic/lumbar compression fractures   # Acute Left femoral intertrochanteric fracture  - CT showed Severe T10 and moderate to severe at T5 vertebral body compression fracture of indeterminate age. Severe compression deformity of L2 vertebral body of indeterminate age.  - CT head does not show acute bleed.   - Neurosurgery following  - rec.  No acute  intervention at present time  - MRI T/L spine- multiple compression fx concern for MM, acute bilateral sacral fractures, concern for myositis     - SPEP - negative for abn. paraproteins   - Neurosurgery spoke with pt's son Dr. Gomez - a orthopedic doc - for  now no plans for neurosurgery at [facility]  - Abdominal binder / TLSO /- Pain control /- Fall precautions / - PT/OT with brace as tolerated   - s/p  MR of Pelvis- acute left intertrochanteric fracture, sacral fractures with soft tissue edema  -9/2- f/up with Orthopedic Surgeon for acute left femoral fracture    # Urinary incontinency - improving after large bowel movement   - Likely Overflow based on hx  - Kidney bladder US with PVR    # Parkinson's disease  - C/W home parkinsons medication    # HTN  - low b/p d/c norvasc; continue Metoprolol ER 50 mg po once daily    # CAD S/P PCI  - Held ASA as pt reports outpt GI held due to gastric ulcers  - Lipitor 20 mg PO QD  - Metoprolol succinate 50 mg PO QD    # Anemia with iron def  - started on daily iron tx.      # Elevated Alk Phos  - Likely 2/2 to bone etiology  - GGT wnl    # Constipation  - Miralax BID,  Senna qhs    # Misc  - DVT Prophylaxis: enoxaparin Injectable  - GI Prophylaxis: pantoprazole    Tablet 40 milliGRAM(s) Oral before breakfast  - Diet: Diet, DASH/TLC  - Code Status: Full    Nain Porter MD  PGY1, Internal Medicine   [facility]      
77 y M w/ PMHx of Parkinson's, back surgery (2019), cardiac stent placement (2005) presents for evaluation of multiple falls w/ back pain.    # Back pain S/P Fall  # Acute bilateral sacral/acute and chronic thoracic/lumbar compression fractures   # Acute Left femoral intertrochanteric fracture  - s/p MR of Pelvis - acute left intertrochanteric fracture, sacral fractures with soft tissue edema  - Pt's son is ortho attending at \[facility]\"", plan for transfer to \[facility]\"" for surgical intervention  - CT showed Severe T10 and moderate to severe at T5 vertebral body compression fracture of indeterminate age. Severe compression deformity of L2 vertebral body of indeterminate age.  - CT head does not show acute bleed.   - Neurosurgery following  - spoke with pt's son (ortho attending at \[facility]\""), rec no acute intervention at present time  - MRI T/L spine- multiple compression fx concern for MM, acute bilateral sacral fractures, concern for myositis     - SPEP - negative for abn. paraproteins   - Abdominal binder / TLSO /- Pain control /- Fall precautions / - PT/OT with brace as tolerated     # Urinary incontinency - improving after large bowel movement   - Likely Overflow based on hx  - Kidney bladder US with PVR    # Parkinson's disease  - C/W home parkinsons medication    # HTN  - low b/p d/c norvasc; continue Metoprolol ER 50 mg po once daily    # CAD S/P PCI  - Held ASA as pt reports outpt GI held due to gastric ulcers  - Lipitor 20 mg PO QD  - Metoprolol succinate 50 mg PO QD    # Anemia with iron def  - started on daily iron tx.      # Elevated Alk Phos  - Likely 2/2 to bone etiology  - GGT wnl    # Constipation  - Miralax BID,  Senna qhs    # Misc  - DVT Prophylaxis: enoxaparin Injectable  - GI Prophylaxis: pantoprazole    Tablet 40 milliGRAM(s) Oral before breakfast  - Diet: Diet, DASH/TLC  - Code Status: Full    Nain Porter MD  PGY1, Internal Medicine   [facility]      
77 y M w/ PMHx of Parkinson's, back surgery (2019), cardiac stent placement (2005) presents for evaluation of multiple falls w/ back pain.    # Back pain S/P Fall R/O cauda Equina syndrome  # Acute b/l sacral fractures  - CT showed Severe T10 and moderate to severe at T5 vertebral body compression fracture of indeterminate age. Severe compression deformity of L2 vertebral body of indeterminate age.  - MRI T/L spine- multiple compression fx concern for MM, acute bilateral sacral fractures, concern for myositis     - F/u MRI pelvis     - UPEP/SPEP ordered, ortho consulted     - Neuro consulted, CPK and aldolase pending per neuro recs     - Neurosurgery spoke with pt's son Dr. Latrell Hernandez - a orthopedic doc at [facility] - now no plans for neurosurgery at [facility]; if procedures are necessary pt wishes for them to be done at [facility]  - Abdominal binder / TLSO   - Pain control   - CT head does not show acute bleed  - EKG  - Fall precautions   - PT/OT  - Orthostatics    # Urinary incontinency - improving after large bowel movement   - Likely Overflow based on hx  - Kidney bladder US with PVR  - 240 PVR    # Parkinson's disease  - C/W home parkinsons medication    # HTN  - C/W amlodipine 5 mg PO QD    # CAD S/P PCI  - C/W ASA  - Lipitor 20 mg PO QD  - Metoprolol succinate 50 mg PO QD    # Anemia   - Unknown baseline  - send iron studies  - B12, Folate -wnl    # hypoNa -resolved   - Serum and urine osmolarity  - Urine sodium  - Encourage PO intake   - Repeat Na    # Elevated Alk Phos  - Likely 2/2 to bone etiology  - GGT wnl    # Constipation  - Miralax BID  - Senna qhs  - 8/30 - pt had good amount bowel movement    # Misc  - DVT Prophylaxis: enoxaparin Injectable  - GI Prophylaxis: pantoprazole    Tablet 40 milliGRAM(s) Oral before breakfast  - Diet: Diet, DASH/TLC  - Code Status: Full    Nain Porter MD  PGY1, Internal Medicine   [facility]      
77 y M w/ PMHx of Parkinson's, back surgery (2019), cardiac stent placement (2005) presents for evaluation of multiple falls w/ back pain.    # Back pain S/P Fall R/O cauda Equina syndrome  # Acute b/l sacral fractures  - CT showed Severe T10 and moderate to severe at T5 vertebral body compression fracture of indeterminate age. Severe compression deformity of L2 vertebral body of indeterminate age.  - MRI T/L spine- multiple compression fx concern for MM, acute bilateral sacral fractures, concern for myositis     - UPEP/SPEP ordered, ortho consulted     - Neuro consulted, CPK and aldolase pending per neuro recs     - Neurosurgery spoke with pt's son Dr. Latrell Hernandez - a orthopedic doc at [facility] - now no plans for neurosurgery at [facility]; if procedures are necessary pt wishes for them to be done at [facility]  - Abdominal binder / TLSO   - Pain control   - CT head does not show acute bleed  - EKG  - Fall precautions   - PT/OT  - Orthostatics    # Urinary incontinency - improving after large bowel movement   - Likely Overflow based on hx  - Kidney bladder US with PVR  - 240 PVR    # Parkinson's disease  - C/W home parkinsons medication    # HTN  - C/W amlodipine 5 mg PO QD      # CAD S/P PCI  - C/W ASA  - Lipitor 20 mg PO QD  - Metoprolol succinate 50 mg PO QD    # Anemia   - Unknown baseline  - send iron studies  - B12, Folate -wnl    # hypoNa -resolved   - Serum and urine osmolarity  - Urine sodium  - Encourage PO intake   - Repeat Na    # Elevated Alk Phos  - Likely 2/2 to bone etiology  - GGT wnl    # Constipation  - Miralax BID  - Senna qhs  - 8/30 - pt had good amount bowel movement    # Misc  - DVT Prophylaxis: enoxaparin Injectable  - GI Prophylaxis: pantoprazole    Tablet 40 milliGRAM(s) Oral before breakfast  - Diet: Diet, DASH/TLC  - Code Status: Full    Nain Porter MD  PGY1, Internal Medicine   [facility]      
77 y M w/ PMHx of Parkinson's, back surgery (2019), cardiac stent placement (2005) presents for evaluation of multiple falls w/ back pain.    # Back pain S/P Fall R/O cauda Equina syndrome  # Acute b/l sacral fractures  - CT showed Severe T10 and moderate to severe at T5 vertebral body compression fracture of indeterminate age. Severe compression deformity of L2 vertebral body of indeterminate age.  - MRI T/L spine- multiple compression fx concern for MM, acute bilateral sacral fractures, concern for myositis     - UPEP/SPEP ordered, ortho consulted, neuro consulted, Neurosurgery spoke with pt's son Dr. Gomez - a orthopedic doc - now no plans for neurosurgery at [facility]  - Abdominal binder / TLSO   - Pain control   - CT head does not show acute bleed.   - EKG  - Fall precautions   - PT/OT  - Orthostatics    # Urinary incontinency - improving after large bowel movement   - Likely Overflow based on hx  - Kidney bladder US with PVR  - 240 PVR    # Parkinson's disease  - C/W home parkinsons medication    # HTN  - C/W amlodipine 5 mg PO QD      # CAD S/P PCI  - C/W ASA  - Lipitor 20 mg PO QD  - Metoprolol succinate 50 mg PO QD    # Anemia   - Unknown baseline  - send iron studies  - B12, Folate -wnl    # hypoNa -resolved   - Serum and urine osmolarity  - Urine sodium  - Encourage PO intake   - Repeat Na    # Elevated Alk Phos  - Likely 2/2 to bone etiology  - GGT wnl    # Constipation  - Miralax BID  - Senna qhs  - 8/30 - pt had good amount bowel movement    # Misc  - DVT Prophylaxis: enoxaparin Injectable  - GI Prophylaxis: pantoprazole    Tablet 40 milliGRAM(s) Oral before breakfast  - Diet: Diet, DASH/TLC  - Code Status: Full    Nain Porter MD  PGY1, Internal Medicine   [facility]      
77 y M w/ PMHx of Parkinson's, back surgery (2019), cardiac stent placement (2005) presents for evaluation of multiple falls w/ back pain.    # Back pain S/P Fall R/O cauda Equina syndrome  - CT showed Severe T10 and moderate to severe at T5 vertebral body compression fracture of indeterminate age. Severe compression deformity of L2 vertebral body of indeterminate age.  - Neurosurgery following   - No acute neurosurgical intervention   - MRI T/L spine w/wo; wife states hardware is MRI compatible  - Abdominal binder / TLSO   - Pain control   - CT head does not show acute bleed.   - EKG  - Fall precautions   - PT/OT  - Orthostatics    # Urinary incontinency   - Likely Overflow based on hx  - Kidney bladder US with PVR  - MRI to R/O cord compression       # Parkinson's disease  - C/W home parkinsons medication    # HTN  - C/W amlodipine 5 mg PO QD      # CAD S/P PCI  - C/W ASA  - Lipitor 20 mg PO QD  - Metoprolol succinate 50 mg PO QD    # Anemia   - Unknown baseline  - send iron studies  - B12, Folate      # hypoNa  - Serum and urine osmolarity  - Urine sodium  - Encourage PO intake   - Repeat Na      # Elevated Alk Phos  - Likely 2/2 to bone etiology  - GGT wnl    # Constipation  - Miralax BID  - Senna qhs  - Fleet enema qd    # Misc  - DVT Prophylaxis: enoxaparin Injectable  - GI Prophylaxis: pantoprazole    Tablet 40 milliGRAM(s) Oral before breakfast  - Diet: Diet, DASH/TLC  - Code Status: Full    Nain Porter MD  PGY1, Internal Medicine   [facility]      
Information: This plan will allow you to select a body location and will not render the procedure on the note output. It will note the location you select in the morphology.
Detail Level: Simple

## 2023-05-01 NOTE — OCCUPATIONAL THERAPY INITIAL EVALUATION ADULT - SITTING BALANCE: STATIC
Received fax from [id number] that they will not dispense Gavilyte C due to sulfa allergy. Please advise on prep alternative.     Vivian Estevez RN     Unable to assess due to pt's inability to tolerate short sitting on edge of bed. See above for details regarding pain.
